# Patient Record
Sex: FEMALE | Race: WHITE | Employment: FULL TIME | URBAN - NONMETROPOLITAN AREA
[De-identification: names, ages, dates, MRNs, and addresses within clinical notes are randomized per-mention and may not be internally consistent; named-entity substitution may affect disease eponyms.]

---

## 2017-01-24 ENCOUNTER — OFFICE VISIT (OUTPATIENT)
Dept: NEUROLOGY | Facility: CLINIC | Age: 52
End: 2017-01-24

## 2017-01-24 VITALS
DIASTOLIC BLOOD PRESSURE: 80 MMHG | HEIGHT: 62 IN | SYSTOLIC BLOOD PRESSURE: 122 MMHG | HEART RATE: 84 BPM | WEIGHT: 160 LBS | BODY MASS INDEX: 29.44 KG/M2

## 2017-01-24 DIAGNOSIS — G43.119 INTRACTABLE MIGRAINE WITH AURA WITHOUT STATUS MIGRAINOSUS: Primary | ICD-10-CM

## 2017-01-24 DIAGNOSIS — R51.9 CHRONIC DAILY HEADACHE: ICD-10-CM

## 2017-01-24 PROCEDURE — 99213 OFFICE O/P EST LOW 20 MIN: CPT | Performed by: PHYSICIAN ASSISTANT

## 2017-01-24 RX ORDER — TOPIRAMATE 150 MG/1
150 CAPSULE, EXTENDED RELEASE ORAL DAILY
Qty: 30 EACH | Refills: 0
Start: 2017-01-24 | End: 2017-04-13

## 2017-01-24 NOTE — PROGRESS NOTES
Subjective   Ml Macdonald is a 52 y.o. female is here today for follow-up.    HPI Comments: She continues to have intermittent substantial migraine.  In a recent attack, she had no aura which is atypical for her.  Overall she feels the extended release topiramate has been well tolerated and has reduced in number and intensity of her headaches.    Continues to have very frequent near daily muscle tension type headaches.  These of improved slightly since her last visit.    She reports no new medical issues otherwise.    Migraine    This is a chronic problem. The current episode started more than 1 year ago. The problem occurs intermittently. The problem has been waxing and waning. The pain is located in the bilateral, retro-orbital, temporal, parietal, occipital and frontal region. The pain does not radiate. The pain quality is similar to prior headaches. The quality of the pain is described as aching, throbbing and pulsating. The pain is severe. Associated symptoms include dizziness, nausea, photophobia and vomiting. Pertinent negatives include no abdominal pain, back pain, coughing, ear pain, eye pain, fever, neck pain, seizures, sore throat, tinnitus or weakness. The symptoms are aggravated by activity, bright light, fatigue, emotional stress and noise. She has tried acetaminophen, antidepressants, beta blockers, Excedrin, darkened room, NSAIDs, injectable narcotics, oral narcotics and triptans for the symptoms. The treatment provided mild relief. Her past medical history is significant for migraine headaches.   Headache    This is a chronic problem. The current episode started more than 1 year ago. The problem occurs daily. The problem has been unchanged. The pain is located in the bilateral region. The pain does not radiate. The pain quality is similar to prior headaches. The quality of the pain is described as aching. The pain is moderate. Associated symptoms include dizziness, nausea, photophobia and vomiting.  Pertinent negatives include no abdominal pain, back pain, coughing, ear pain, eye pain, fever, neck pain, seizures, sore throat, tinnitus or weakness. The symptoms are aggravated by activity, bright light, fatigue, emotional stress, noise, hunger and work. She has tried acetaminophen, antidepressants, beta blockers, Excedrin, darkened room, NSAIDs, injectable narcotics, oral narcotics and triptans for the symptoms. The treatment provided moderate relief. Her past medical history is significant for migraine headaches.       The following portions of the patient's history were reviewed and updated as appropriate: allergies, current medications, past family history, past medical history, past social history, past surgical history and problem list.    Review of Systems   Constitutional: Positive for fatigue. Negative for chills and fever.   HENT: Negative for ear pain, nosebleeds, sore throat and tinnitus.    Eyes: Positive for photophobia. Negative for pain and visual disturbance.   Respiratory: Negative.  Negative for cough.    Cardiovascular: Negative.    Gastrointestinal: Positive for nausea and vomiting. Negative for abdominal pain.   Endocrine: Negative.    Genitourinary: Negative.    Musculoskeletal: Negative.  Negative for back pain and neck pain.   Skin: Negative.    Allergic/Immunologic: Negative.    Neurological: Positive for dizziness and headaches. Negative for seizures and weakness.   Hematological: Negative.    Psychiatric/Behavioral: Negative.        Objective   Physical Exam   Constitutional: She is oriented to person, place, and time. Vital signs are normal. She appears well-developed and well-nourished. No distress.   HENT:   Head: Normocephalic and atraumatic.   Right Ear: Hearing, tympanic membrane, external ear and ear canal normal.   Left Ear: Hearing, tympanic membrane, external ear and ear canal normal.   Nose: Nose normal.   Mouth/Throat: Uvula is midline, oropharynx is clear and moist and  mucous membranes are normal.   Eyes: Conjunctivae, EOM and lids are normal. Pupils are equal, round, and reactive to light. No scleral icterus.   Fundoscopic exam:       The right eye shows no hemorrhage and no papilledema. The right eye shows red reflex.        The left eye shows no hemorrhage and no papilledema. The left eye shows red reflex.   Neck: Normal range of motion and phonation normal. No spinous process tenderness and no muscular tenderness present. Carotid bruit is not present. Normal range of motion present. No thyroid mass and no thyromegaly present.   Cardiovascular: Normal rate, regular rhythm, S1 normal, S2 normal and normal heart sounds.    No murmur heard.  Pulmonary/Chest: Effort normal and breath sounds normal. No stridor. No respiratory distress. She has no wheezes. She has no rales.   Musculoskeletal: Normal range of motion. She exhibits no edema or tenderness.        Lumbar back: Normal.   Lymphadenopathy:     She has no cervical adenopathy.   Neurological: She is alert and oriented to person, place, and time. She has normal strength and normal reflexes. She displays no atrophy and no tremor. No cranial nerve deficit or sensory deficit. She exhibits normal muscle tone. She displays a negative Romberg sign. Coordination and gait normal. GCS eye subscore is 4. GCS verbal subscore is 5. GCS motor subscore is 6.   Reflex Scores:       Tricep reflexes are 2+ on the right side and 2+ on the left side.       Bicep reflexes are 2+ on the right side and 2+ on the left side.       Brachioradialis reflexes are 2+ on the right side and 2+ on the left side.       Patellar reflexes are 2+ on the right side and 2+ on the left side.       Achilles reflexes are 2+ on the right side and 2+ on the left side.  Skin: Skin is warm, dry and intact. No ecchymosis, no lesion and no rash noted. No cyanosis. Nails show no clubbing.   Psychiatric: She has a normal mood and affect. Her speech is normal and behavior is  normal. Judgment and thought content normal. She is not actively hallucinating. Cognition and memory are normal. She is attentive.   Nursing note and vitals reviewed.        Assessment/Plan   Ml was seen today for migraine and headache.    Diagnoses and all orders for this visit:    Intractable migraine with aura without status migrainosus  -     Ambulatory Referral to Neurology  -     Ambulatory Referral to Neurology  -     Topiramate  MG capsule extended-release 24 hour sprinkle; Take 150 mg by mouth Daily.    Chronic daily headache    We are going to refer her for gentle Botox as well as auriculotemporal and occipital nerve blocks.  We will increase her Qudexy to 150 mg daily.    She is asked to fill out some [facility] paperwork for occasional absences for migraine and we will do this for her.  15 minutes of a 20 minute outpatient visit was spent in counseling and coordination of care today.          EMR Dragon transcription disclaimer:  Much of this encounter note is an electronic transcription/translation of spoken language to printed text.  The electronic translation of spoken language may permit erroneous, or at times, nonsensical words or phrases to be inadvertently transcribed.  The author has reviewed the note for such errors, however some may still exist.

## 2017-01-25 ENCOUNTER — TELEPHONE (OUTPATIENT)
Dept: NEUROLOGY | Facility: CLINIC | Age: 52
End: 2017-01-25

## 2017-01-25 NOTE — TELEPHONE ENCOUNTER
Ml called and asked that we please attach the [employer] number she provided to her [employer] papers. I did write that on her forms.

## 2017-02-15 ENCOUNTER — TELEPHONE (OUTPATIENT)
Dept: NEUROLOGY | Facility: CLINIC | Age: 52
End: 2017-02-15

## 2017-02-15 NOTE — TELEPHONE ENCOUNTER
I called Ml back to let her know that I did receive her call. She called to say that her FMLA has been denied. They did tell her that they never received a faxed copy of the completed forms. I faxed the forms again and I did mail the originals to Mrs. Macdonald. She did voice understanding.

## 2017-03-03 ENCOUNTER — PROCEDURE VISIT (OUTPATIENT)
Dept: NEUROLOGY | Facility: CLINIC | Age: 52
End: 2017-03-03

## 2017-03-03 VITALS
WEIGHT: 160 LBS | HEIGHT: 62 IN | HEART RATE: 78 BPM | BODY MASS INDEX: 29.44 KG/M2 | DIASTOLIC BLOOD PRESSURE: 70 MMHG | SYSTOLIC BLOOD PRESSURE: 118 MMHG

## 2017-03-03 DIAGNOSIS — R51.9 CHRONIC DAILY HEADACHE: ICD-10-CM

## 2017-03-03 DIAGNOSIS — G43.119 INTRACTABLE MIGRAINE WITH AURA WITHOUT STATUS MIGRAINOSUS: Primary | ICD-10-CM

## 2017-03-03 PROCEDURE — 99213 OFFICE O/P EST LOW 20 MIN: CPT | Performed by: PHYSICIAN ASSISTANT

## 2017-03-03 PROCEDURE — 64405 NJX AA&/STRD GR OCPL NRV: CPT | Performed by: PHYSICIAN ASSISTANT

## 2017-03-03 RX ORDER — TRIAMCINOLONE ACETONIDE 40 MG/ML
12 INJECTION, SUSPENSION INTRA-ARTICULAR; INTRAMUSCULAR ONCE
Status: COMPLETED | OUTPATIENT
Start: 2017-03-03 | End: 2017-03-03

## 2017-03-03 RX ADMIN — TRIAMCINOLONE ACETONIDE 12 MG: 40 INJECTION, SUSPENSION INTRA-ARTICULAR; INTRAMUSCULAR at 15:36

## 2017-03-05 NOTE — PROGRESS NOTES
Subjective   Ml Macdonald is a 52 y.o. female is here today for follow-up.    HPI Comments: She continues to have intermittent substantial migraine.  In a recent attack, she had no aura which is atypical for her.  Overall she feels the extended release topiramate has been well tolerated and has reduced in number and intensity of her headaches.    Continues to have very frequent near daily muscle tension type headaches.  These of improved slightly since her last visit.    She reports no new medical issues otherwise.    Migraine    This is a chronic problem. The current episode started more than 1 year ago. The problem occurs intermittently. The problem has been waxing and waning. The pain is located in the bilateral, retro-orbital, temporal, parietal, occipital and frontal region. The pain does not radiate. The pain quality is similar to prior headaches. The quality of the pain is described as aching, throbbing and pulsating. The pain is severe. Associated symptoms include dizziness, nausea, photophobia and vomiting. Pertinent negatives include no abdominal pain, back pain, coughing, ear pain, eye pain, fever, neck pain, seizures, sore throat, tinnitus or weakness. The symptoms are aggravated by activity, bright light, fatigue, emotional stress and noise. She has tried acetaminophen, antidepressants, beta blockers, Excedrin, darkened room, NSAIDs, injectable narcotics, oral narcotics and triptans for the symptoms. The treatment provided mild relief. Her past medical history is significant for migraine headaches.   Headache    This is a chronic problem. The current episode started more than 1 year ago. The problem occurs daily. The problem has been unchanged. The pain is located in the bilateral region. The pain does not radiate. The pain quality is similar to prior headaches. The quality of the pain is described as aching. The pain is moderate. Associated symptoms include dizziness, nausea, photophobia and vomiting.  Pertinent negatives include no abdominal pain, back pain, coughing, ear pain, eye pain, fever, neck pain, seizures, sore throat, tinnitus or weakness. The symptoms are aggravated by activity, bright light, fatigue, emotional stress, noise, hunger and work. She has tried acetaminophen, antidepressants, beta blockers, Excedrin, darkened room, NSAIDs, injectable narcotics, oral narcotics and triptans for the symptoms. The treatment provided moderate relief. Her past medical history is significant for migraine headaches.       The following portions of the patient's history were reviewed and updated as appropriate: allergies, current medications, past family history, past medical history, past social history, past surgical history and problem list.    Review of Systems   Constitutional: Positive for fatigue. Negative for chills and fever.   HENT: Negative for ear pain, nosebleeds, sore throat and tinnitus.    Eyes: Positive for photophobia. Negative for pain and visual disturbance.   Respiratory: Negative.  Negative for cough.    Cardiovascular: Negative.    Gastrointestinal: Positive for nausea and vomiting. Negative for abdominal pain.   Endocrine: Negative.    Genitourinary: Negative.    Musculoskeletal: Negative.  Negative for back pain and neck pain.   Skin: Negative.    Allergic/Immunologic: Negative.    Neurological: Positive for dizziness and headaches. Negative for seizures and weakness.   Hematological: Negative.    Psychiatric/Behavioral: Negative.        Objective   Physical Exam   Constitutional: She is oriented to person, place, and time. Vital signs are normal. She appears well-developed and well-nourished. No distress.   HENT:   Head: Normocephalic and atraumatic.   Right Ear: Hearing, tympanic membrane, external ear and ear canal normal.   Left Ear: Hearing, tympanic membrane, external ear and ear canal normal.   Nose: Nose normal.   Mouth/Throat: Uvula is midline, oropharynx is clear and moist and  mucous membranes are normal.   Eyes: Conjunctivae, EOM and lids are normal. Pupils are equal, round, and reactive to light. No scleral icterus.   Fundoscopic exam:       The right eye shows no hemorrhage and no papilledema. The right eye shows red reflex.        The left eye shows no hemorrhage and no papilledema. The left eye shows red reflex.   Neck: Normal range of motion and phonation normal. No spinous process tenderness and no muscular tenderness present. Carotid bruit is not present. Normal range of motion present. No thyroid mass and no thyromegaly present.   Cardiovascular: Normal rate, regular rhythm, S1 normal, S2 normal and normal heart sounds.    No murmur heard.  Pulmonary/Chest: Effort normal and breath sounds normal. No stridor. No respiratory distress. She has no wheezes. She has no rales.   Musculoskeletal: Normal range of motion. She exhibits no edema or tenderness.        Lumbar back: Normal.   Lymphadenopathy:     She has no cervical adenopathy.   Neurological: She is alert and oriented to person, place, and time. She has normal strength and normal reflexes. She displays no atrophy and no tremor. No cranial nerve deficit or sensory deficit. She exhibits normal muscle tone. She displays a negative Romberg sign. Coordination and gait normal. GCS eye subscore is 4. GCS verbal subscore is 5. GCS motor subscore is 6.   Reflex Scores:       Tricep reflexes are 2+ on the right side and 2+ on the left side.       Bicep reflexes are 2+ on the right side and 2+ on the left side.       Brachioradialis reflexes are 2+ on the right side and 2+ on the left side.       Patellar reflexes are 2+ on the right side and 2+ on the left side.       Achilles reflexes are 2+ on the right side and 2+ on the left side.  Skin: Skin is warm, dry and intact. No ecchymosis, no lesion and no rash noted. No cyanosis. Nails show no clubbing.   Psychiatric: She has a normal mood and affect. Her speech is normal and behavior is  normal. Judgment and thought content normal. She is not actively hallucinating. Cognition and memory are normal. She is attentive.   Nursing note and vitals reviewed.        Assessment/Plan   Ml was seen today for migraine.    Diagnoses and all orders for this visit:    Intractable migraine with aura without status migrainosus  -     triamcinolone acetonide (KENALOG-40) injection 12 mg; 0.3 mL by Peripheral Nerve route 1 (One) Time.  -     triamcinolone acetonide (KENALOG-40) injection 12 mg; 0.3 mL by Peripheral Nerve route 1 (One) Time.    Chronic daily headache  -     triamcinolone acetonide (KENALOG-40) injection 12 mg; 0.3 mL by Peripheral Nerve route 1 (One) Time.  -     triamcinolone acetonide (KENALOG-40) injection 12 mg; 0.3 mL by Peripheral Nerve route 1 (One) Time.    We are going to increase her extended release topiramate to 200 mg per day.  Further titration may be required depending on her symptoms and tolerance.    We reviewed peripheral nerve blocks and the roll of these procedures in the treatment of her headache.    Referral for Botox injections for her migraines is still pending approval.    10 minutes of 15 minute outpatient visit was spent in counseling and coordination of care this was above beyond that required for the auricular temporal branch block and occipital nerve block.          EMR Dragon transcription disclaimer:  Much of this encounter note is an electronic transcription/translation of spoken language to printed text.  The electronic translation of spoken language may permit erroneous, or at times, nonsensical words or phrases to be inadvertently transcribed.  The author has reviewed the note for such errors, however some may still exist.

## 2017-03-06 NOTE — PROGRESS NOTES
Procedure   Nerve Block  Date/Time: 3/6/2017 10:33 AM  Performed by: JACK BRANCH  Authorized by: JACK BRANCH   Consent: Verbal consent obtained.  Consent given by: patient  Patient understanding: patient states understanding of the procedure being performed  Patient consent: the patient's understanding of the procedure matches consent given  Procedure consent: procedure consent matches procedure scheduled  Relevant documents: relevant documents present and verified  Test results: test results available and properly labeled  Site marked: the operative site was not marked  Imaging studies: imaging studies not available  Required items: required blood products, implants, devices, and special equipment available  Patient identity confirmed: verbally with patient  Indications: pain relief  Body area: head  Nerve: greater occipital  Laterality: left  Sedation:  Patient sedated: no    Preparation: Patient was prepped and draped in the usual sterile fashion.  Patient position: sitting  Needle size: 27 G  Location technique: anatomical landmarks  Local Anesthetic: lidocaine 1% without epinephrine   Anesthetic total: 0.7 mL  Outcome: pain improved  Patient tolerance: Patient tolerated the procedure well with no immediate complications

## 2017-03-06 NOTE — PROGRESS NOTES
Procedure   Nerve Block  Date/Time: 3/6/2017 10:31 AM  Performed by: JACK BRANCH  Authorized by: JACK BRANCH   Consent: Verbal consent obtained.  Risks and benefits: risks, benefits and alternatives were discussed  Consent given by: patient  Patient understanding: patient states understanding of the procedure being performed  Patient consent: the patient's understanding of the procedure matches consent given  Procedure consent: procedure consent matches procedure scheduled  Relevant documents: relevant documents present and verified  Test results: test results available and properly labeled  Site marked: the operative site was not marked  Imaging studies: imaging studies not available  Patient identity confirmed: verbally with patient  Indications: pain relief  Body area: head  Nerve: trigeminal  Laterality: left  Sedation:  Patient sedated: no    Preparation: Patient was prepped and draped in the usual sterile fashion.  Patient position: sitting  Needle size: 27 G  Location technique: anatomical landmarks  Local Anesthetic: lidocaine 1% without epinephrine   Anesthetic total: 0.7 mL  Outcome: pain improved  Patient tolerance: Patient tolerated the procedure well with no immediate complications

## 2017-03-14 ENCOUNTER — TELEPHONE (OUTPATIENT)
Dept: NEUROLOGY | Facility: CLINIC | Age: 52
End: 2017-03-14

## 2017-03-14 NOTE — TELEPHONE ENCOUNTER
Mrs Macdonald called to see if she could have more samples of topiramate XR. I did tell her that she could pick some up this afternoon. She did voice understanding.

## 2017-03-15 ENCOUNTER — TELEPHONE (OUTPATIENT)
Dept: NEUROLOGY | Facility: CLINIC | Age: 52
End: 2017-03-15

## 2017-03-15 NOTE — TELEPHONE ENCOUNTER
I did attempt to call Ml to schedule her initial Botox injection but her Voice Mail is not set up. I will try again.

## 2017-04-13 ENCOUNTER — OFFICE VISIT (OUTPATIENT)
Dept: NEUROLOGY | Facility: CLINIC | Age: 52
End: 2017-04-13

## 2017-04-13 VITALS
BODY MASS INDEX: 29.44 KG/M2 | SYSTOLIC BLOOD PRESSURE: 115 MMHG | RESPIRATION RATE: 18 BRPM | TEMPERATURE: 98 F | WEIGHT: 160 LBS | DIASTOLIC BLOOD PRESSURE: 80 MMHG | HEIGHT: 62 IN | HEART RATE: 80 BPM

## 2017-04-13 DIAGNOSIS — R51.9 CHRONIC DAILY HEADACHE: ICD-10-CM

## 2017-04-13 DIAGNOSIS — G43.119 INTRACTABLE MIGRAINE WITH AURA WITHOUT STATUS MIGRAINOSUS: Primary | ICD-10-CM

## 2017-04-13 PROCEDURE — 99213 OFFICE O/P EST LOW 20 MIN: CPT | Performed by: PHYSICIAN ASSISTANT

## 2017-04-13 RX ORDER — TOPIRAMATE 150 MG/1
150 CAPSULE, EXTENDED RELEASE ORAL DAILY
COMMUNITY
End: 2017-04-18 | Stop reason: DRUGHIGH

## 2017-04-19 NOTE — PROGRESS NOTES
Subjective   Ml Macdonald is a 52 y.o. female is here today for follow-up.    HPI Comments: She continues to have intermittent substantial migraine.  She reports two significant migraines this last seen.  Overall she feels the extended release topiramate has been well tolerated and has reduced in number and intensity of her headaches.    Continues to have very frequent near daily muscle tension type headaches.  These of improved slightly since her last visit.    She reports no new medical issues otherwise.    Migraine    This is a chronic problem. The current episode started more than 1 year ago. The problem occurs intermittently. The problem has been gradually improving. The pain is located in the bilateral, retro-orbital, temporal, parietal, occipital and frontal region. The pain does not radiate. The pain quality is similar to prior headaches. The quality of the pain is described as aching, throbbing and pulsating. The pain is severe. Associated symptoms include dizziness, nausea, photophobia and vomiting. Pertinent negatives include no abdominal pain, back pain, coughing, ear pain, eye pain, fever, neck pain, seizures, sore throat, tinnitus or weakness. The symptoms are aggravated by activity, bright light, fatigue, emotional stress and noise. She has tried acetaminophen, antidepressants, beta blockers, Excedrin, darkened room, NSAIDs, injectable narcotics, oral narcotics and triptans for the symptoms. The treatment provided mild relief. Her past medical history is significant for migraine headaches.   Headache    This is a chronic problem. The current episode started more than 1 year ago. The problem occurs daily. The problem has been unchanged. The pain is located in the bilateral region. The pain does not radiate. The pain quality is similar to prior headaches. The quality of the pain is described as aching. The pain is moderate. Associated symptoms include dizziness, nausea, photophobia and vomiting.  Pertinent negatives include no abdominal pain, back pain, coughing, ear pain, eye pain, fever, neck pain, seizures, sore throat, tinnitus or weakness. The symptoms are aggravated by activity, bright light, fatigue, emotional stress, noise, hunger and work. She has tried acetaminophen, antidepressants, beta blockers, Excedrin, darkened room, NSAIDs, injectable narcotics, oral narcotics and triptans for the symptoms. The treatment provided moderate relief. Her past medical history is significant for migraine headaches.       The following portions of the patient's history were reviewed and updated as appropriate: allergies, current medications, past family history, past medical history, past social history, past surgical history and problem list.    Review of Systems   Constitutional: Positive for fatigue. Negative for chills and fever.   HENT: Negative for ear pain, nosebleeds, sore throat and tinnitus.    Eyes: Positive for photophobia. Negative for pain and visual disturbance.   Respiratory: Negative.  Negative for cough.    Cardiovascular: Negative.    Gastrointestinal: Positive for nausea and vomiting. Negative for abdominal pain.   Endocrine: Negative.    Genitourinary: Negative.    Musculoskeletal: Negative.  Negative for back pain and neck pain.   Skin: Negative.    Allergic/Immunologic: Negative.    Neurological: Positive for dizziness and headaches. Negative for seizures and weakness.   Hematological: Negative.    Psychiatric/Behavioral: Negative.        Objective   Physical Exam   Constitutional: She is oriented to person, place, and time. Vital signs are normal. She appears well-developed and well-nourished. No distress.   HENT:   Head: Normocephalic and atraumatic.   Right Ear: Hearing, tympanic membrane, external ear and ear canal normal.   Left Ear: Hearing, tympanic membrane, external ear and ear canal normal.   Nose: Nose normal.   Mouth/Throat: Uvula is midline, oropharynx is clear and moist and  mucous membranes are normal.   Eyes: Conjunctivae, EOM and lids are normal. Pupils are equal, round, and reactive to light. No scleral icterus.   Fundoscopic exam:       The right eye shows no hemorrhage and no papilledema. The right eye shows red reflex.        The left eye shows no hemorrhage and no papilledema. The left eye shows red reflex.   Neck: Normal range of motion and phonation normal. No spinous process tenderness and no muscular tenderness present. Carotid bruit is not present. Normal range of motion present. No thyroid mass and no thyromegaly present.   Cardiovascular: Normal rate, regular rhythm, S1 normal, S2 normal and normal heart sounds.    No murmur heard.  Pulmonary/Chest: Effort normal and breath sounds normal. No stridor. No respiratory distress. She has no wheezes. She has no rales.   Musculoskeletal: Normal range of motion. She exhibits no edema or tenderness.   Lymphadenopathy:     She has no cervical adenopathy.   Neurological: She is alert and oriented to person, place, and time. She has normal strength and normal reflexes. She displays no atrophy and no tremor. No cranial nerve deficit or sensory deficit. She exhibits normal muscle tone. She displays a negative Romberg sign. Coordination and gait normal. GCS eye subscore is 4. GCS verbal subscore is 5. GCS motor subscore is 6.   Reflex Scores:       Tricep reflexes are 2+ on the right side and 2+ on the left side.       Bicep reflexes are 2+ on the right side and 2+ on the left side.       Brachioradialis reflexes are 2+ on the right side and 2+ on the left side.       Patellar reflexes are 2+ on the right side and 2+ on the left side.       Achilles reflexes are 2+ on the right side and 2+ on the left side.  Skin: Skin is warm, dry and intact. No ecchymosis, no lesion and no rash noted. No cyanosis. Nails show no clubbing.   Psychiatric: She has a normal mood and affect. Her speech is normal and behavior is normal. Judgment and thought  content normal. She is not actively hallucinating. Cognition and memory are normal. She is attentive.   Nursing note and vitals reviewed.        Assessment/Plan   Ml was seen today for follow-up.    Diagnoses and all orders for this visit:    Intractable migraine with aura without status migrainosus  -     Topiramate ER (TROKENDI XR) 200 MG capsule sustained-release 24 hr; Take 200 mg by mouth Daily.    Chronic daily headache    We will increase extended release topiramate 200 mg daily.    Continue other medications unchanged.    10 minutes of a 15 minute outpatient visit was spent in counseling and coordination of care today.        EMR Dragon transcription disclaimer:  Much of this encounter note is an electronic transcription/translation of spoken language to printed text.  The electronic translation of spoken language may permit erroneous, or at times, nonsensical words or phrases to be inadvertently transcribed.  The author has reviewed the note for such errors, however some may still exist.

## 2017-05-12 DIAGNOSIS — G43.119 INTRACTABLE MIGRAINE WITH AURA WITHOUT STATUS MIGRAINOSUS: ICD-10-CM

## 2017-05-15 DIAGNOSIS — G43.119 INTRACTABLE MIGRAINE WITH AURA WITHOUT STATUS MIGRAINOSUS: ICD-10-CM

## 2017-05-26 ENCOUNTER — OFFICE VISIT (OUTPATIENT)
Dept: NEUROLOGY | Facility: CLINIC | Age: 52
End: 2017-05-26

## 2017-05-26 VITALS
HEIGHT: 62 IN | BODY MASS INDEX: 29.26 KG/M2 | SYSTOLIC BLOOD PRESSURE: 116 MMHG | HEART RATE: 78 BPM | DIASTOLIC BLOOD PRESSURE: 74 MMHG | WEIGHT: 159 LBS

## 2017-05-26 DIAGNOSIS — G43.119 INTRACTABLE MIGRAINE WITH AURA WITHOUT STATUS MIGRAINOSUS: Primary | ICD-10-CM

## 2017-05-26 DIAGNOSIS — R51.9 CHRONIC DAILY HEADACHE: ICD-10-CM

## 2017-05-26 PROCEDURE — 99213 OFFICE O/P EST LOW 20 MIN: CPT | Performed by: PHYSICIAN ASSISTANT

## 2017-05-26 RX ORDER — TOPIRAMATE 200 MG/1
200 CAPSULE, EXTENDED RELEASE ORAL DAILY
Qty: 30 CAPSULE | Refills: 11 | Status: SHIPPED | OUTPATIENT
Start: 2017-05-26 | End: 2018-06-08 | Stop reason: SDUPTHER

## 2017-06-12 NOTE — PROGRESS NOTES
Subjective   Ml Macdonald is a 52 y.o. female is here today for follow-up.    HPI Comments: She continues to have intermittent substantial migraine.  She reports two significant migraines this last seen.  Overall she feels the extended release topiramate has been well tolerated and has reduced in number and intensity of her headaches.    Continues to have very frequent near daily muscle tension type headaches.  These of improved slightly since her last visit.    She reports no new medical issues otherwise.    Migraine    This is a chronic problem. The current episode started more than 1 year ago. The problem occurs intermittently. The problem has been rapidly improving. The pain is located in the bilateral, retro-orbital, temporal, parietal, occipital and frontal region. The pain does not radiate. The pain quality is similar to prior headaches. The quality of the pain is described as aching, throbbing and pulsating. The pain is severe. Associated symptoms include dizziness, nausea, photophobia and vomiting. Pertinent negatives include no abdominal pain, back pain, coughing, ear pain, eye pain, fever, neck pain, seizures, sore throat, tinnitus or weakness. The symptoms are aggravated by activity, bright light, fatigue, emotional stress and noise. She has tried acetaminophen, antidepressants, beta blockers, Excedrin, darkened room, NSAIDs, injectable narcotics, oral narcotics and triptans for the symptoms. The treatment provided mild relief. Her past medical history is significant for migraine headaches.   Headache    This is a chronic problem. The current episode started more than 1 year ago. The problem occurs daily. The problem has been waxing and waning. The pain is located in the bilateral region. The pain does not radiate. The pain quality is similar to prior headaches. The quality of the pain is described as aching. The pain is moderate. Associated symptoms include dizziness, nausea, photophobia and vomiting.  Pertinent negatives include no abdominal pain, back pain, coughing, ear pain, eye pain, fever, neck pain, seizures, sore throat, tinnitus or weakness. The symptoms are aggravated by activity, bright light, fatigue, emotional stress, noise, hunger and work. She has tried acetaminophen, antidepressants, beta blockers, Excedrin, darkened room, NSAIDs, injectable narcotics, oral narcotics and triptans for the symptoms. The treatment provided moderate relief. Her past medical history is significant for migraine headaches.       The following portions of the patient's history were reviewed and updated as appropriate: allergies, current medications, past family history, past medical history, past social history, past surgical history and problem list.    Review of Systems   Constitutional: Positive for fatigue. Negative for chills and fever.   HENT: Negative for ear pain, nosebleeds, sore throat and tinnitus.    Eyes: Positive for photophobia. Negative for pain and visual disturbance.   Respiratory: Negative.  Negative for cough.    Cardiovascular: Negative.    Gastrointestinal: Positive for nausea and vomiting. Negative for abdominal pain.   Endocrine: Negative.    Genitourinary: Negative.    Musculoskeletal: Negative.  Negative for back pain and neck pain.   Skin: Negative.    Allergic/Immunologic: Negative.    Neurological: Positive for dizziness and headaches. Negative for seizures and weakness.   Hematological: Negative.    Psychiatric/Behavioral: Negative.        Objective   Physical Exam   Constitutional: She is oriented to person, place, and time. Vital signs are normal. She appears well-developed and well-nourished. No distress.   HENT:   Head: Normocephalic and atraumatic.   Right Ear: Hearing, tympanic membrane, external ear and ear canal normal.   Left Ear: Hearing, tympanic membrane, external ear and ear canal normal.   Nose: Nose normal.   Mouth/Throat: Uvula is midline, oropharynx is clear and moist and  mucous membranes are normal.   Eyes: Conjunctivae, EOM and lids are normal. Pupils are equal, round, and reactive to light. No scleral icterus.   Fundoscopic exam:       The right eye shows no hemorrhage and no papilledema. The right eye shows red reflex.        The left eye shows no hemorrhage and no papilledema. The left eye shows red reflex.   Neck: Normal range of motion and phonation normal. No spinous process tenderness and no muscular tenderness present. Carotid bruit is not present. Normal range of motion present. No thyroid mass and no thyromegaly present.   Cardiovascular: Normal rate, regular rhythm, S1 normal, S2 normal and normal heart sounds.    No murmur heard.  Pulmonary/Chest: Effort normal and breath sounds normal. No stridor. No respiratory distress. She has no wheezes. She has no rales.   Musculoskeletal: Normal range of motion. She exhibits no edema or tenderness.   Lymphadenopathy:     She has no cervical adenopathy.   Neurological: She is alert and oriented to person, place, and time. She has normal strength and normal reflexes. She displays no atrophy and no tremor. No cranial nerve deficit or sensory deficit. She exhibits normal muscle tone. She displays a negative Romberg sign. Coordination and gait normal. GCS eye subscore is 4. GCS verbal subscore is 5. GCS motor subscore is 6.   Reflex Scores:       Tricep reflexes are 2+ on the right side and 2+ on the left side.       Bicep reflexes are 2+ on the right side and 2+ on the left side.       Brachioradialis reflexes are 2+ on the right side and 2+ on the left side.       Patellar reflexes are 2+ on the right side and 2+ on the left side.       Achilles reflexes are 2+ on the right side and 2+ on the left side.  Skin: Skin is warm, dry and intact. No ecchymosis, no lesion and no rash noted. No cyanosis. Nails show no clubbing.   Psychiatric: She has a normal mood and affect. Her speech is normal and behavior is normal. Judgment and thought  content normal. She is not actively hallucinating. Cognition and memory are normal. She is attentive.   Nursing note and vitals reviewed.        Assessment/Plan    Ml was seen today for migraine.    Diagnoses and all orders for this visit:    Intractable migraine with aura without status migrainosus  -     TROKENDI  MG capsule sustained-release 24 hr; Take 200 mg by mouth Daily.    Chronic daily headache              EMR Dragon transcription disclaimer:  Much of this encounter note is an electronic transcription/translation of spoken language to printed text.  The electronic translation of spoken language may permit erroneous, or at times, nonsensical words or phrases to be inadvertently transcribed.  The author has reviewed the note for such errors, however some may still exist.

## 2017-08-25 ENCOUNTER — OFFICE VISIT (OUTPATIENT)
Dept: NEUROLOGY | Facility: CLINIC | Age: 52
End: 2017-08-25

## 2017-08-25 VITALS
BODY MASS INDEX: 29.63 KG/M2 | HEART RATE: 74 BPM | HEIGHT: 62 IN | DIASTOLIC BLOOD PRESSURE: 78 MMHG | SYSTOLIC BLOOD PRESSURE: 132 MMHG | WEIGHT: 161 LBS

## 2017-08-25 DIAGNOSIS — G43.119 INTRACTABLE MIGRAINE WITH AURA WITHOUT STATUS MIGRAINOSUS: Primary | ICD-10-CM

## 2017-08-25 PROCEDURE — 99213 OFFICE O/P EST LOW 20 MIN: CPT | Performed by: PHYSICIAN ASSISTANT

## 2017-08-25 RX ORDER — SUMATRIPTAN 100 MG/1
100 TABLET, FILM COATED ORAL
Qty: 6 TABLET | Refills: 3 | Status: SHIPPED | OUTPATIENT
Start: 2017-08-25 | End: 2018-03-09 | Stop reason: SDUPTHER

## 2017-08-25 RX ORDER — SUMATRIPTAN SUCCINATE 11 MG/1
11 CAPSULE NASAL SEE ADMIN INSTRUCTIONS
Qty: 8 EACH | Refills: 6 | Status: SHIPPED | OUTPATIENT
Start: 2017-08-25 | End: 2019-09-06

## 2017-08-25 NOTE — PROGRESS NOTES
Subjective   Ml Macdonald is a 52 y.o. female is here today for follow-up.    HPI Comments: She continues to have intermittent substantial migraine.  She reports two significant migraines this last seen.  Overall she feels the extended release topiramate has been well tolerated and has reduced in number and intensity of her headaches.  Onzetra works very well episodically with rapid onset of relief and minimal side effects.    Significant improvement in her daily headache as well.    She reports no new medical issues otherwise.    Migraine    This is a chronic problem. The current episode started more than 1 year ago. The problem occurs intermittently. The problem has been rapidly improving. The pain is located in the bilateral, retro-orbital, temporal, parietal, occipital and frontal region. The pain does not radiate. The pain quality is similar to prior headaches. The quality of the pain is described as aching, throbbing and pulsating. The pain is severe. Associated symptoms include dizziness, nausea and photophobia. Pertinent negatives include no abdominal pain, ear pain, eye pain, fever, seizures, sore throat, tinnitus, vomiting or weakness. The symptoms are aggravated by activity, bright light, fatigue, emotional stress and noise. She has tried acetaminophen, antidepressants, beta blockers, Excedrin, darkened room, NSAIDs, injectable narcotics, oral narcotics and triptans for the symptoms. The treatment provided mild relief. Her past medical history is significant for migraine headaches.   Headache    This is a chronic problem. The current episode started more than 1 year ago. The problem occurs daily. The problem has been waxing and waning. The pain is located in the bilateral region. The pain does not radiate. The pain quality is similar to prior headaches. The quality of the pain is described as aching. The pain is moderate. Associated symptoms include dizziness, nausea and photophobia. Pertinent negatives  include no abdominal pain, ear pain, eye pain, fever, seizures, sore throat, tinnitus, vomiting or weakness. The symptoms are aggravated by activity, bright light, fatigue, emotional stress, noise, hunger and work. She has tried acetaminophen, antidepressants, beta blockers, Excedrin, darkened room, NSAIDs, injectable narcotics, oral narcotics and triptans for the symptoms. The treatment provided moderate relief. Her past medical history is significant for migraine headaches.       The following portions of the patient's history were reviewed and updated as appropriate: allergies, current medications, past family history, past medical history, past social history, past surgical history and problem list.    Review of Systems   Constitutional: Positive for fatigue. Negative for chills and fever.   HENT: Negative for congestion, ear pain, nosebleeds, sore throat, tinnitus and trouble swallowing.    Eyes: Positive for photophobia. Negative for pain and visual disturbance.   Respiratory: Negative.    Cardiovascular: Negative.    Gastrointestinal: Positive for nausea. Negative for abdominal pain and vomiting.   Endocrine: Negative.    Genitourinary: Negative.    Musculoskeletal: Negative.    Skin: Negative.    Allergic/Immunologic: Negative.    Neurological: Positive for dizziness and headaches. Negative for seizures and weakness.   Hematological: Negative.    Psychiatric/Behavioral: Negative.        Objective   Physical Exam   Constitutional: She is oriented to person, place, and time. Vital signs are normal. She appears well-developed and well-nourished. No distress.   HENT:   Head: Normocephalic and atraumatic.   Right Ear: Hearing, tympanic membrane, external ear and ear canal normal.   Left Ear: Hearing, tympanic membrane, external ear and ear canal normal.   Nose: Nose normal.   Mouth/Throat: Uvula is midline, oropharynx is clear and moist and mucous membranes are normal.   Eyes: Conjunctivae, EOM and lids are  normal. Pupils are equal, round, and reactive to light. No scleral icterus.   Fundoscopic exam:       The right eye shows no hemorrhage and no papilledema. The right eye shows red reflex.        The left eye shows no hemorrhage and no papilledema. The left eye shows red reflex.   Neck: Normal range of motion and phonation normal. No spinous process tenderness and no muscular tenderness present. Carotid bruit is not present. Normal range of motion present. No thyroid mass and no thyromegaly present.   Cardiovascular: Normal rate, regular rhythm, S1 normal, S2 normal and normal heart sounds.    No murmur heard.  Pulmonary/Chest: Effort normal and breath sounds normal. No stridor. No respiratory distress. She has no wheezes. She has no rales.   Musculoskeletal: Normal range of motion. She exhibits no edema or tenderness.   Lymphadenopathy:     She has no cervical adenopathy.   Neurological: She is alert and oriented to person, place, and time. She has normal strength and normal reflexes. She displays no atrophy and no tremor. No cranial nerve deficit or sensory deficit. She exhibits normal muscle tone. She displays a negative Romberg sign. Coordination and gait normal. GCS eye subscore is 4. GCS verbal subscore is 5. GCS motor subscore is 6.   Reflex Scores:       Tricep reflexes are 2+ on the right side and 2+ on the left side.       Bicep reflexes are 2+ on the right side and 2+ on the left side.       Brachioradialis reflexes are 2+ on the right side and 2+ on the left side.       Patellar reflexes are 2+ on the right side and 2+ on the left side.       Achilles reflexes are 2+ on the right side and 2+ on the left side.  Skin: Skin is warm, dry and intact. No ecchymosis, no lesion and no rash noted. No cyanosis. Nails show no clubbing.   Psychiatric: She has a normal mood and affect. Her speech is normal and behavior is normal. Judgment and thought content normal. She is not actively hallucinating. Cognition and  memory are normal. She is attentive.   Nursing note and vitals reviewed.        Assessment/Plan   Ml was seen today for migraine.    Diagnoses and all orders for this visit:    Intractable migraine with aura without status migrainosus  -     ONZETRA XSAIL 11 MG/NOSEPC Exhaler Powder; 11 mg into each nostril See Admin Instructions.  -     SUMAtriptan (IMITREX) 100 MG tablet; Take 1 tablet by mouth Every 2 (Two) Hours As Needed for Migraine (headache).    She is having some difficulty obtaining Onzetra, therefore we will allow her to use of Imitrex tablets even though these of been less effective because of her rapid onset of nausea until we can get Onzetra approved for her.    Her migraines are reduced significantly on Trokendi and we will continue this.    10 minutes of 15 minute outpatient visit was spent in counseling and coordination of care today.          EMR Dragon transcription disclaimer:  Much of this encounter note is an electronic transcription/translation of spoken language to printed text.  The electronic translation of spoken language may permit erroneous, or at times, nonsensical words or phrases to be inadvertently transcribed.  The author has reviewed the note for such errors, however some may still exist.

## 2017-12-06 ENCOUNTER — TELEPHONE (OUTPATIENT)
Dept: NEUROLOGY | Facility: CLINIC | Age: 52
End: 2017-12-06

## 2017-12-06 NOTE — TELEPHONE ENCOUNTER
Ml called to see if we have received her [facility] paperwork. I did call her to let her know that I have not received any paperwork for her yet. We did confirm the fax number and she will call them to have it refaxed.

## 2018-03-09 ENCOUNTER — OFFICE VISIT (OUTPATIENT)
Dept: NEUROLOGY | Facility: CLINIC | Age: 53
End: 2018-03-09

## 2018-03-09 VITALS
SYSTOLIC BLOOD PRESSURE: 118 MMHG | DIASTOLIC BLOOD PRESSURE: 88 MMHG | BODY MASS INDEX: 30 KG/M2 | WEIGHT: 163 LBS | HEART RATE: 76 BPM | HEIGHT: 62 IN

## 2018-03-09 DIAGNOSIS — R51.9 CHRONIC DAILY HEADACHE: ICD-10-CM

## 2018-03-09 DIAGNOSIS — G43.119 INTRACTABLE MIGRAINE WITH AURA WITHOUT STATUS MIGRAINOSUS: Primary | ICD-10-CM

## 2018-03-09 PROCEDURE — 99214 OFFICE O/P EST MOD 30 MIN: CPT | Performed by: PHYSICIAN ASSISTANT

## 2018-03-09 RX ORDER — SUMATRIPTAN 100 MG/1
100 TABLET, FILM COATED ORAL
Qty: 6 TABLET | Refills: 6 | Status: SHIPPED | OUTPATIENT
Start: 2018-03-09 | End: 2019-05-17 | Stop reason: ALTCHOICE

## 2018-03-09 RX ORDER — MEMANTINE HYDROCHLORIDE 10 MG/1
10 TABLET ORAL 2 TIMES DAILY
Qty: 60 TABLET | Refills: 3 | Status: SHIPPED | OUTPATIENT
Start: 2018-03-09 | End: 2018-07-09 | Stop reason: SDUPTHER

## 2018-03-15 NOTE — PROGRESS NOTES
Subjective   Ml Macdonald is a 53 y.o. female is here today for follow-up.    Migraine    This is a chronic problem. The current episode started more than 1 year ago. The problem occurs intermittently. The problem has been rapidly improving. The pain is located in the bilateral, retro-orbital, temporal, parietal, occipital and frontal region. The pain does not radiate. The pain quality is similar to prior headaches. The quality of the pain is described as aching, throbbing and pulsating. The pain is severe. Associated symptoms include dizziness, nausea and photophobia. Pertinent negatives include no abdominal pain, ear pain, eye pain, fever, seizures, sore throat, tinnitus, vomiting or weakness. The symptoms are aggravated by activity, bright light, fatigue, emotional stress and noise. She has tried acetaminophen, antidepressants, beta blockers, Excedrin, darkened room, NSAIDs, injectable narcotics, oral narcotics and triptans for the symptoms. The treatment provided mild relief. Her past medical history is significant for migraine headaches.   Headache    This is a chronic problem. The current episode started more than 1 year ago. The problem occurs daily. The problem has been waxing and waning. The pain is located in the bilateral region. The pain does not radiate. The pain quality is similar to prior headaches. The quality of the pain is described as aching. The pain is moderate. Associated symptoms include dizziness, nausea and photophobia. Pertinent negatives include no abdominal pain, ear pain, eye pain, fever, seizures, sore throat, tinnitus, vomiting or weakness. The symptoms are aggravated by activity, bright light, fatigue, emotional stress, noise, hunger and work. She has tried acetaminophen, antidepressants, beta blockers, Excedrin, darkened room, NSAIDs, injectable narcotics, oral narcotics and triptans for the symptoms. The treatment provided moderate relief. Her past medical history is significant  for migraine headaches.       The following portions of the patient's history were reviewed and updated as appropriate: allergies, current medications, past family history, past medical history, past social history, past surgical history and problem list.    Review of Systems   Constitutional: Positive for fatigue. Negative for chills and fever.   HENT: Negative for congestion, ear pain, nosebleeds, sore throat, tinnitus and trouble swallowing.    Eyes: Positive for photophobia. Negative for pain and visual disturbance.   Respiratory: Negative.    Cardiovascular: Negative.    Gastrointestinal: Positive for nausea. Negative for abdominal pain and vomiting.   Endocrine: Negative.    Genitourinary: Negative.    Musculoskeletal: Negative.    Skin: Negative.    Allergic/Immunologic: Negative.    Neurological: Positive for dizziness and headaches. Negative for seizures and weakness.   Hematological: Negative.    Psychiatric/Behavioral: Negative.        Objective   Physical Exam   Constitutional: She is oriented to person, place, and time. Vital signs are normal. She appears well-developed and well-nourished. No distress.   HENT:   Head: Normocephalic and atraumatic.   Right Ear: Hearing, tympanic membrane, external ear and ear canal normal.   Left Ear: Hearing, tympanic membrane, external ear and ear canal normal.   Nose: Nose normal.   Mouth/Throat: Uvula is midline, oropharynx is clear and moist and mucous membranes are normal.   Eyes: Conjunctivae, EOM and lids are normal. Pupils are equal, round, and reactive to light. No scleral icterus.   Fundoscopic exam:       The right eye shows no hemorrhage and no papilledema. The right eye shows red reflex.        The left eye shows no hemorrhage and no papilledema. The left eye shows red reflex.   Neck: Normal range of motion and phonation normal. No spinous process tenderness and no muscular tenderness present. Carotid bruit is not present. Normal range of motion present. No  thyroid mass and no thyromegaly present.   Cardiovascular: Normal rate, regular rhythm, S1 normal, S2 normal and normal heart sounds.    No murmur heard.  Pulmonary/Chest: Effort normal and breath sounds normal. No stridor. No respiratory distress. She has no wheezes. She has no rales.   Musculoskeletal: Normal range of motion. She exhibits no edema or tenderness.   Lymphadenopathy:     She has no cervical adenopathy.   Neurological: She is alert and oriented to person, place, and time. She has normal strength and normal reflexes. She displays no atrophy and no tremor. No cranial nerve deficit or sensory deficit. She exhibits normal muscle tone. She displays a negative Romberg sign. Coordination and gait normal. GCS eye subscore is 4. GCS verbal subscore is 5. GCS motor subscore is 6.   Reflex Scores:       Tricep reflexes are 2+ on the right side and 2+ on the left side.       Bicep reflexes are 2+ on the right side and 2+ on the left side.       Brachioradialis reflexes are 2+ on the right side and 2+ on the left side.       Patellar reflexes are 2+ on the right side and 2+ on the left side.       Achilles reflexes are 2+ on the right side and 2+ on the left side.  Skin: Skin is warm, dry and intact. No ecchymosis, no lesion and no rash noted. No cyanosis. Nails show no clubbing.   Psychiatric: She has a normal mood and affect. Her speech is normal and behavior is normal. Judgment and thought content normal. She is not actively hallucinating. Cognition and memory are normal. She is attentive.   Nursing note and vitals reviewed.        Assessment/Plan   Ml was seen today for migraine and headache.    Diagnoses and all orders for this visit:    Intractable migraine with aura without status migrainosus  -     memantine (NAMENDA) 10 MG tablet; Take 1 tablet by mouth 2 (Two) Times a Day.  -     SUMAtriptan (IMITREX) 100 MG tablet; Take 1 tablet by mouth Every 2 (Two) Hours As Needed for Migraine  (headache).    Chronic daily headache    Medication recommendations as above.  Medication strategies are nonmedical strategies for controlling her headaches are reviewed in detail.  20 minutes of a 25 minute outpatient visit was spent in counseling and coordination of care today.      EMR Dragon transcription disclaimer:  Much of this encounter note is an electronic transcription/translation of spoken language to printed text.  The electronic translation of spoken language may permit erroneous, or at times, nonsensical words or phrases to be inadvertently transcribed.  The author has reviewed the note for such errors, however some may still exist.

## 2018-06-08 ENCOUNTER — OFFICE VISIT (OUTPATIENT)
Dept: NEUROLOGY | Facility: CLINIC | Age: 53
End: 2018-06-08

## 2018-06-08 VITALS
HEART RATE: 76 BPM | SYSTOLIC BLOOD PRESSURE: 122 MMHG | BODY MASS INDEX: 29.81 KG/M2 | HEIGHT: 62 IN | DIASTOLIC BLOOD PRESSURE: 78 MMHG | WEIGHT: 162 LBS

## 2018-06-08 DIAGNOSIS — G43.119 INTRACTABLE MIGRAINE WITH AURA WITHOUT STATUS MIGRAINOSUS: Primary | ICD-10-CM

## 2018-06-08 DIAGNOSIS — R51.9 CHRONIC DAILY HEADACHE: ICD-10-CM

## 2018-06-08 PROCEDURE — 99213 OFFICE O/P EST LOW 20 MIN: CPT | Performed by: PHYSICIAN ASSISTANT

## 2018-06-08 RX ORDER — TOPIRAMATE 200 MG/1
200 CAPSULE, EXTENDED RELEASE ORAL DAILY
Qty: 30 CAPSULE | Refills: 11 | Status: SHIPPED | OUTPATIENT
Start: 2018-06-08 | End: 2019-07-08 | Stop reason: SDUPTHER

## 2018-06-08 RX ORDER — INDOMETHACIN 25 MG/1
50 CAPSULE ORAL 3 TIMES DAILY PRN
Qty: 60 CAPSULE | Refills: 1 | Status: SHIPPED | OUTPATIENT
Start: 2018-06-08 | End: 2018-10-05 | Stop reason: SDUPTHER

## 2018-06-08 NOTE — PROGRESS NOTES
Subjective   Ml Macdonald is a 53 y.o. female is here today for follow-up.    Migraine    This is a chronic problem. The current episode started more than 1 year ago. The problem occurs intermittently. The problem has been unchanged. The pain is located in the bilateral, retro-orbital, temporal, parietal, occipital and frontal region. The pain does not radiate. The pain quality is similar to prior headaches. The quality of the pain is described as aching, throbbing and pulsating. The pain is severe. Associated symptoms include dizziness, nausea and photophobia. Pertinent negatives include no abdominal pain, ear pain, eye pain, fever, seizures, sore throat, tinnitus, vomiting or weakness. The symptoms are aggravated by activity, bright light, fatigue, emotional stress and noise. She has tried acetaminophen, antidepressants, beta blockers, Excedrin, darkened room, NSAIDs, injectable narcotics, oral narcotics and triptans for the symptoms. The treatment provided significant relief. Her past medical history is significant for migraine headaches.   Headache    This is a chronic problem. The current episode started more than 1 year ago. The problem occurs daily. The problem has been unchanged. The pain is located in the bilateral region. The pain does not radiate. The pain quality is similar to prior headaches. The quality of the pain is described as aching. The pain is moderate. Associated symptoms include dizziness, nausea and photophobia. Pertinent negatives include no abdominal pain, ear pain, eye pain, fever, seizures, sore throat, tinnitus, vomiting or weakness. The symptoms are aggravated by activity, bright light, fatigue, emotional stress, noise, hunger and work. She has tried acetaminophen, antidepressants, beta blockers, Excedrin, darkened room, NSAIDs, injectable narcotics, oral narcotics and triptans for the symptoms. The treatment provided moderate relief. Her past medical history is significant for  migraine headaches.       The following portions of the patient's history were reviewed and updated as appropriate: allergies, current medications, past family history, past medical history, past social history, past surgical history and problem list.    Review of Systems   Constitutional: Positive for fatigue. Negative for fever.   HENT: Negative for congestion, ear pain, nosebleeds, sore throat, tinnitus and trouble swallowing.    Eyes: Positive for photophobia. Negative for pain and visual disturbance.   Respiratory: Negative.    Cardiovascular: Negative.    Gastrointestinal: Positive for nausea. Negative for abdominal pain and vomiting.   Endocrine: Negative.    Genitourinary: Negative.    Musculoskeletal: Negative.    Skin: Negative.    Allergic/Immunologic: Negative.    Neurological: Positive for dizziness and headaches. Negative for seizures and weakness.   Hematological: Negative.    Psychiatric/Behavioral: Negative.        Objective   Physical Exam   Constitutional: She is oriented to person, place, and time. Vital signs are normal. She appears well-developed and well-nourished. No distress.   HENT:   Head: Normocephalic and atraumatic.   Right Ear: Hearing, tympanic membrane, external ear and ear canal normal.   Left Ear: Hearing, tympanic membrane, external ear and ear canal normal.   Nose: Nose normal.   Mouth/Throat: Uvula is midline, oropharynx is clear and moist and mucous membranes are normal.   Eyes: Conjunctivae, EOM and lids are normal. Pupils are equal, round, and reactive to light. No scleral icterus.   Fundoscopic exam:       The right eye shows no hemorrhage and no papilledema. The right eye shows red reflex.        The left eye shows no hemorrhage and no papilledema. The left eye shows red reflex.   Neck: Normal range of motion and phonation normal. No spinous process tenderness and no muscular tenderness present. Carotid bruit is not present. Normal range of motion present. No thyroid mass  and no thyromegaly present.   Cardiovascular: Normal rate, regular rhythm, S1 normal, S2 normal and normal heart sounds.    No murmur heard.  Pulmonary/Chest: Effort normal and breath sounds normal. No stridor. No respiratory distress. She has no wheezes. She has no rales.   Musculoskeletal: Normal range of motion. She exhibits no edema or tenderness.   Lymphadenopathy:     She has no cervical adenopathy.   Neurological: She is alert and oriented to person, place, and time. She has normal strength and normal reflexes. She displays no atrophy and no tremor. No cranial nerve deficit or sensory deficit. She exhibits normal muscle tone. She displays a negative Romberg sign. Coordination and gait normal. GCS eye subscore is 4. GCS verbal subscore is 5. GCS motor subscore is 6.   Reflex Scores:       Tricep reflexes are 2+ on the right side and 2+ on the left side.       Bicep reflexes are 2+ on the right side and 2+ on the left side.       Brachioradialis reflexes are 2+ on the right side and 2+ on the left side.       Patellar reflexes are 2+ on the right side and 2+ on the left side.       Achilles reflexes are 2+ on the right side and 2+ on the left side.  Skin: Skin is warm, dry and intact. No ecchymosis, no lesion and no rash noted. No cyanosis. Nails show no clubbing.   Psychiatric: She has a normal mood and affect. Her speech is normal and behavior is normal. Judgment and thought content normal. She is not actively hallucinating. Cognition and memory are normal. She is attentive.   Nursing note and vitals reviewed.        Assessment/Plan   Ml was seen today for migraine and headache.    Diagnoses and all orders for this visit:    Intractable migraine with aura without status migrainosus  -     TROKENDI  MG capsule sustained-release 24 hr; Take 200 mg by mouth Daily.    Chronic daily headache  -     indomethacin (INDOCIN) 25 MG capsule; Take 2 capsules by mouth 3 (Three) Times a Day As Needed  (Headache).    The patient is stable with regard to her migraines on prophylactic therapy that includes memantine 10 mg twice per day and Trokendi 200 mg once per day.  The patient has previously failed regular Topamax and continuation of brand name Trokendi is recommended.    For her now rare breakthrough migraines, she will continue to use sumatriptan episodically.    The patient has had some substantial muscle tension type headaches and she may use episodic indomethacin for these troubling headaches.    10 minutes of a 15 minute outpatient visit was spent in counseling and coordination of care the patient and her family today.    Dictated utilizing Dragon dictation.

## 2018-07-09 DIAGNOSIS — G43.119 INTRACTABLE MIGRAINE WITH AURA WITHOUT STATUS MIGRAINOSUS: ICD-10-CM

## 2018-07-10 RX ORDER — MEMANTINE HYDROCHLORIDE 10 MG/1
TABLET ORAL
Qty: 180 TABLET | Refills: 1 | Status: SHIPPED | OUTPATIENT
Start: 2018-07-10 | End: 2019-02-08 | Stop reason: SDUPTHER

## 2018-10-05 ENCOUNTER — OFFICE VISIT (OUTPATIENT)
Dept: NEUROLOGY | Facility: CLINIC | Age: 53
End: 2018-10-05

## 2018-10-05 VITALS
BODY MASS INDEX: 29.63 KG/M2 | HEART RATE: 83 BPM | WEIGHT: 161 LBS | SYSTOLIC BLOOD PRESSURE: 110 MMHG | OXYGEN SATURATION: 98 % | DIASTOLIC BLOOD PRESSURE: 80 MMHG | HEIGHT: 62 IN

## 2018-10-05 DIAGNOSIS — G43.009 MIGRAINE WITHOUT AURA AND WITHOUT STATUS MIGRAINOSUS, NOT INTRACTABLE: Primary | ICD-10-CM

## 2018-10-05 DIAGNOSIS — R51.9 CHRONIC DAILY HEADACHE: ICD-10-CM

## 2018-10-05 PROCEDURE — 99213 OFFICE O/P EST LOW 20 MIN: CPT | Performed by: PHYSICIAN ASSISTANT

## 2018-10-05 RX ORDER — PROCHLORPERAZINE MALEATE 10 MG
10 TABLET ORAL EVERY 6 HOURS PRN
Qty: 30 TABLET | Refills: 1 | Status: SHIPPED | OUTPATIENT
Start: 2018-10-05 | End: 2020-10-30 | Stop reason: SDUPTHER

## 2018-10-05 RX ORDER — INDOMETHACIN 25 MG/1
50 CAPSULE ORAL 3 TIMES DAILY PRN
Qty: 60 CAPSULE | Refills: 1 | Status: SHIPPED | OUTPATIENT
Start: 2018-10-05 | End: 2019-02-08 | Stop reason: SDUPTHER

## 2018-10-05 NOTE — PROGRESS NOTES
Subjective   Ml Macdonald is a 53 y.o. female is here today for follow-up.    Migraine    This is a chronic problem. The current episode started more than 1 year ago. The problem occurs intermittently. The problem has been gradually improving. The pain is located in the bilateral, retro-orbital, temporal, parietal, occipital and frontal region. The pain does not radiate. The pain quality is similar to prior headaches. The quality of the pain is described as aching, throbbing and pulsating. The pain is severe. Associated symptoms include dizziness, nausea, phonophobia, photophobia and vomiting. Pertinent negatives include no weakness. The symptoms are aggravated by activity, bright light, fatigue, emotional stress and noise. She has tried acetaminophen, antidepressants, beta blockers, Excedrin, darkened room, NSAIDs, injectable narcotics, oral narcotics and triptans for the symptoms. The treatment provided significant relief. Her past medical history is significant for migraine headaches.   Headache    This is a chronic problem. The current episode started more than 1 year ago. The problem occurs intermittently. The problem has been gradually improving. The pain is located in the bilateral region. The pain does not radiate. The pain quality is similar to prior headaches. The quality of the pain is described as aching. The pain is moderate. Associated symptoms include dizziness, nausea, phonophobia, photophobia and vomiting. Pertinent negatives include no weakness. The symptoms are aggravated by activity, bright light, fatigue, emotional stress, noise, hunger and work. She has tried acetaminophen, antidepressants, beta blockers, Excedrin, darkened room, NSAIDs, injectable narcotics, oral narcotics and triptans for the symptoms. The treatment provided significant relief. Her past medical history is significant for migraine headaches.       The following portions of the patient's history were reviewed and updated as  appropriate: allergies, current medications, past family history, past medical history, past social history, past surgical history and problem list.    Review of Systems   Constitutional: Positive for fatigue.   HENT: Negative for trouble swallowing.    Eyes: Positive for photophobia.   Respiratory: Negative.    Cardiovascular: Negative.    Gastrointestinal: Positive for nausea and vomiting.   Endocrine: Negative.    Genitourinary: Negative.    Musculoskeletal: Negative.    Skin: Negative.    Allergic/Immunologic: Negative.    Neurological: Positive for dizziness and headaches. Negative for weakness.   Hematological: Negative.    Psychiatric/Behavioral: Negative.        Objective   Physical Exam   Constitutional: She is oriented to person, place, and time. Vital signs are normal. She appears well-developed and well-nourished. No distress.   HENT:   Head: Normocephalic and atraumatic.   Right Ear: Hearing, tympanic membrane, external ear and ear canal normal.   Left Ear: Hearing, tympanic membrane, external ear and ear canal normal.   Nose: Nose normal.   Mouth/Throat: Uvula is midline, oropharynx is clear and moist and mucous membranes are normal.   Eyes: Pupils are equal, round, and reactive to light. Conjunctivae, EOM and lids are normal. No scleral icterus.   Fundoscopic exam:       The right eye shows no hemorrhage and no papilledema. The right eye shows red reflex.        The left eye shows no hemorrhage and no papilledema. The left eye shows red reflex.   Neck: Normal range of motion and phonation normal. No spinous process tenderness and no muscular tenderness present. Carotid bruit is not present. Normal range of motion present. No thyroid mass and no thyromegaly present.   Cardiovascular: Normal rate, regular rhythm, S1 normal, S2 normal and normal heart sounds.    No murmur heard.  Pulmonary/Chest: Effort normal and breath sounds normal. No stridor. No respiratory distress. She has no wheezes. She has no  rales.   Musculoskeletal: Normal range of motion. She exhibits no edema or tenderness.   Lymphadenopathy:     She has no cervical adenopathy.   Neurological: She is alert and oriented to person, place, and time. She has normal strength and normal reflexes. She displays no atrophy and no tremor. No cranial nerve deficit or sensory deficit. She exhibits normal muscle tone. She displays a negative Romberg sign. Coordination and gait normal. GCS eye subscore is 4. GCS verbal subscore is 5. GCS motor subscore is 6.   Reflex Scores:       Tricep reflexes are 2+ on the right side and 2+ on the left side.       Bicep reflexes are 2+ on the right side and 2+ on the left side.       Brachioradialis reflexes are 2+ on the right side and 2+ on the left side.       Patellar reflexes are 2+ on the right side and 2+ on the left side.       Achilles reflexes are 2+ on the right side and 2+ on the left side.  Skin: Skin is warm, dry and intact. No ecchymosis, no lesion and no rash noted. No cyanosis. Nails show no clubbing.   Psychiatric: She has a normal mood and affect. Her speech is normal and behavior is normal. Judgment and thought content normal. Cognition and memory are normal.   Nursing note and vitals reviewed.        Assessment/Plan   Ml was seen today for migraine.    Diagnoses and all orders for this visit:    Migraine without aura and without status migrainosus, not intractable  -     indomethacin (INDOCIN) 25 MG capsule; Take 2 capsules by mouth 3 (Three) Times a Day As Needed (Headache).  -     prochlorperazine (COMPAZINE) 10 MG tablet; Take 1 tablet by mouth Every 6 (Six) Hours As Needed for Nausea or Vomiting (migraine).    Chronic daily headache  -     indomethacin (INDOCIN) 25 MG capsule; Take 2 capsules by mouth 3 (Three) Times a Day As Needed (Headache).      The patient has had substantial improvement in both her daily headaches and migraine headaches.  Her migraines since last seen and involved nausea with  vomiting and we have suggested the addition of Compazine which she can take concurrently with Imitrex.    10 minutes of a 15 minute outpatient visit was spent in counseling and coordination of care with the patient and her family today.  Questions regarding medication recommendations, non-medication treatment of migraines and her current medication recommendations were reviewed in detail.    Dictated utilizing Dragon dictation.

## 2019-02-08 ENCOUNTER — OFFICE VISIT (OUTPATIENT)
Dept: NEUROLOGY | Facility: CLINIC | Age: 54
End: 2019-02-08

## 2019-02-08 VITALS
HEIGHT: 62 IN | DIASTOLIC BLOOD PRESSURE: 82 MMHG | SYSTOLIC BLOOD PRESSURE: 124 MMHG | WEIGHT: 164 LBS | BODY MASS INDEX: 30.18 KG/M2 | HEART RATE: 80 BPM

## 2019-02-08 DIAGNOSIS — G43.119 INTRACTABLE MIGRAINE WITH AURA WITHOUT STATUS MIGRAINOSUS: Primary | ICD-10-CM

## 2019-02-08 DIAGNOSIS — G43.009 MIGRAINE WITHOUT AURA AND WITHOUT STATUS MIGRAINOSUS, NOT INTRACTABLE: ICD-10-CM

## 2019-02-08 DIAGNOSIS — R51.9 CHRONIC DAILY HEADACHE: ICD-10-CM

## 2019-02-08 PROCEDURE — 99214 OFFICE O/P EST MOD 30 MIN: CPT | Performed by: PHYSICIAN ASSISTANT

## 2019-02-08 RX ORDER — MEMANTINE HYDROCHLORIDE 10 MG/1
10 TABLET ORAL 2 TIMES DAILY
Qty: 180 TABLET | Refills: 1 | Status: SHIPPED | OUTPATIENT
Start: 2019-02-08 | End: 2019-08-24 | Stop reason: SDUPTHER

## 2019-02-08 RX ORDER — INDOMETHACIN 25 MG/1
50 CAPSULE ORAL 3 TIMES DAILY PRN
Qty: 60 CAPSULE | Refills: 1 | Status: SHIPPED | OUTPATIENT
Start: 2019-02-08 | End: 2019-11-24 | Stop reason: SDUPTHER

## 2019-02-08 RX ORDER — GALCANEZUMAB 120 MG/ML
240 INJECTION, SOLUTION SUBCUTANEOUS ONCE
Qty: 2 PEN | Refills: 0 | Status: SHIPPED | OUTPATIENT
Start: 2019-02-08 | End: 2019-03-13 | Stop reason: SDUPTHER

## 2019-02-08 NOTE — PROGRESS NOTES
Subjective   Ml Macdonald is a 54 y.o. female is here today for follow-up.    Migraine    This is a chronic problem. The current episode started more than 1 year ago. The problem occurs intermittently. The problem has been unchanged. The pain is located in the bilateral, retro-orbital, temporal, parietal, occipital and frontal region. The pain does not radiate. The pain quality is similar to prior headaches. The quality of the pain is described as aching, throbbing and pulsating. The pain is severe. Associated symptoms include dizziness, nausea, phonophobia, photophobia and vomiting. Pertinent negatives include no weakness. The symptoms are aggravated by activity, bright light, fatigue, emotional stress and noise. She has tried acetaminophen, antidepressants, beta blockers, Excedrin, darkened room, NSAIDs, injectable narcotics, oral narcotics and triptans for the symptoms. The treatment provided mild relief. Her past medical history is significant for migraine headaches.   Headache    This is a chronic problem. The current episode started more than 1 year ago. The problem occurs intermittently. The problem has been unchanged. The pain is located in the bilateral region. The pain does not radiate. The pain quality is similar to prior headaches. The quality of the pain is described as aching. The pain is moderate. Associated symptoms include dizziness, nausea, phonophobia, photophobia and vomiting. Pertinent negatives include no weakness. The symptoms are aggravated by activity, bright light, fatigue, emotional stress, noise, hunger and work. She has tried acetaminophen, antidepressants, beta blockers, Excedrin, darkened room, NSAIDs, injectable narcotics, oral narcotics and triptans for the symptoms. The treatment provided moderate relief. Her past medical history is significant for migraine headaches.       The following portions of the patient's history were reviewed and updated as appropriate: allergies, current  medications, past family history, past medical history, past social history, past surgical history and problem list.    Review of Systems   Constitutional: Positive for fatigue.   HENT: Negative for trouble swallowing.    Eyes: Positive for photophobia.   Respiratory: Negative.    Cardiovascular: Negative.    Gastrointestinal: Positive for nausea and vomiting.   Endocrine: Negative.    Genitourinary: Negative.    Musculoskeletal: Negative.    Skin: Negative.    Allergic/Immunologic: Negative.    Neurological: Positive for dizziness and headaches. Negative for weakness.   Hematological: Negative.    Psychiatric/Behavioral: Negative.        Objective   Physical Exam   Constitutional: She is oriented to person, place, and time. Vital signs are normal. She appears well-developed and well-nourished. No distress.   HENT:   Head: Normocephalic and atraumatic.   Right Ear: Hearing, tympanic membrane, external ear and ear canal normal.   Left Ear: Hearing, tympanic membrane, external ear and ear canal normal.   Nose: Nose normal.   Mouth/Throat: Uvula is midline, oropharynx is clear and moist and mucous membranes are normal.   Eyes: Conjunctivae, EOM and lids are normal. Pupils are equal, round, and reactive to light. No scleral icterus.   Fundoscopic exam:       The right eye shows no hemorrhage and no papilledema. The right eye shows red reflex.        The left eye shows no hemorrhage and no papilledema. The left eye shows red reflex.   Neck: Normal range of motion and phonation normal. Carotid bruit is not present. No thyroid mass present.   Cardiovascular: Normal rate, regular rhythm, S1 normal, S2 normal and normal heart sounds.   No murmur heard.  Pulmonary/Chest: Effort normal and breath sounds normal. No stridor. No respiratory distress. She has no wheezes. She has no rales.   Musculoskeletal: Normal range of motion. She exhibits no edema or tenderness.   Lymphadenopathy:     She has no cervical adenopathy.    Neurological: She is alert and oriented to person, place, and time. She has normal strength and normal reflexes. She displays no atrophy and no tremor. No cranial nerve deficit or sensory deficit. She exhibits normal muscle tone. She displays a negative Romberg sign. Coordination and gait normal. GCS eye subscore is 4. GCS verbal subscore is 5. GCS motor subscore is 6.   Reflex Scores:       Tricep reflexes are 2+ on the right side and 2+ on the left side.       Bicep reflexes are 2+ on the right side and 2+ on the left side.       Brachioradialis reflexes are 2+ on the right side and 2+ on the left side.       Patellar reflexes are 2+ on the right side and 2+ on the left side.       Achilles reflexes are 2+ on the right side and 2+ on the left side.  Skin: Skin is warm, dry and intact. No ecchymosis, no lesion and no rash noted. No cyanosis. Nails show no clubbing.   Psychiatric: She has a normal mood and affect. Her speech is normal and behavior is normal. Judgment and thought content normal. Cognition and memory are normal.   Nursing note and vitals reviewed.        Assessment/Plan   Ml was seen today for migraine and headache.    Diagnoses and all orders for this visit:    Intractable migraine with aura without status migrainosus  -     memantine (NAMENDA) 10 MG tablet; Take 1 tablet by mouth 2 (Two) Times a Day.  -     EMGALITY 120 MG/ML prefilled syringe; Inject 2 mL under the skin into the appropriate area as directed 1 (One) Time for 1 dose.  -     galcanezumab-gnlm (EMGALITY) 120 MG/ML prefilled syringe; Inject 1 mL under the skin into the appropriate area as directed Every 30 (Thirty) Days.    Chronic daily headache  -     indomethacin (INDOCIN) 25 MG capsule; Take 2 capsules by mouth 3 (Three) Times a Day As Needed (Headache).    Migraine without aura and without status migrainosus, not intractable  -     indomethacin (INDOCIN) 25 MG capsule; Take 2 capsules by mouth 3 (Three) Times a Day As Needed  (Headache).    We will add Emgality to her present medication regimen.  The autoinjector is demonstrated to the patient today.  No other changes are made in her medication regimen.    20 minutes of a 25-minute outpatient visit was spent in counseling and coordination of care.  Findings, recommendations, medications and patient questions are reviewed in detail.    Dictated utilizing Dragon dictation.

## 2019-03-13 DIAGNOSIS — G43.119 INTRACTABLE MIGRAINE WITH AURA WITHOUT STATUS MIGRAINOSUS: ICD-10-CM

## 2019-03-13 RX ORDER — GALCANEZUMAB 120 MG/ML
120 INJECTION, SOLUTION SUBCUTANEOUS
Qty: 1 ML | Refills: 5 | Status: SHIPPED | OUTPATIENT
Start: 2019-03-13 | End: 2019-05-17 | Stop reason: SDUPTHER

## 2019-05-17 ENCOUNTER — OFFICE VISIT (OUTPATIENT)
Dept: NEUROLOGY | Facility: CLINIC | Age: 54
End: 2019-05-17

## 2019-05-17 VITALS
OXYGEN SATURATION: 97 % | BODY MASS INDEX: 28.53 KG/M2 | WEIGHT: 161 LBS | DIASTOLIC BLOOD PRESSURE: 80 MMHG | SYSTOLIC BLOOD PRESSURE: 100 MMHG | RESPIRATION RATE: 18 BRPM | HEART RATE: 98 BPM | HEIGHT: 63 IN

## 2019-05-17 DIAGNOSIS — G43.109 MIGRAINE WITH AURA AND WITHOUT STATUS MIGRAINOSUS, NOT INTRACTABLE: Primary | ICD-10-CM

## 2019-05-17 DIAGNOSIS — R51.9 CHRONIC DAILY HEADACHE: ICD-10-CM

## 2019-05-17 PROCEDURE — 99214 OFFICE O/P EST MOD 30 MIN: CPT | Performed by: PHYSICIAN ASSISTANT

## 2019-05-17 RX ORDER — CITALOPRAM 20 MG/1
40 TABLET ORAL DAILY
Refills: 0 | COMMUNITY
Start: 2019-05-06 | End: 2021-12-21 | Stop reason: SDUPTHER

## 2019-05-17 RX ORDER — POTASSIUM CHLORIDE 750 MG/1
1 CAPSULE, EXTENDED RELEASE ORAL AS NEEDED
Refills: 2 | COMMUNITY
Start: 2019-05-07

## 2019-05-17 RX ORDER — ERGOCALCIFEROL 1.25 MG/1
1 CAPSULE ORAL WEEKLY
COMMUNITY
Start: 2019-03-11

## 2019-07-08 DIAGNOSIS — G43.119 INTRACTABLE MIGRAINE WITH AURA WITHOUT STATUS MIGRAINOSUS: ICD-10-CM

## 2019-07-08 RX ORDER — TOPIRAMATE 200 MG/1
200 CAPSULE, EXTENDED RELEASE ORAL DAILY
Qty: 30 CAPSULE | Refills: 5 | Status: SHIPPED | OUTPATIENT
Start: 2019-07-08 | End: 2020-01-20 | Stop reason: SDUPTHER

## 2019-08-18 DIAGNOSIS — G43.119 INTRACTABLE MIGRAINE WITH AURA WITHOUT STATUS MIGRAINOSUS: ICD-10-CM

## 2019-08-19 RX ORDER — GALCANEZUMAB 120 MG/ML
120 INJECTION, SOLUTION SUBCUTANEOUS
Qty: 1 PEN | Refills: 5 | Status: SHIPPED | OUTPATIENT
Start: 2019-08-19 | End: 2020-03-06

## 2019-08-24 DIAGNOSIS — G43.119 INTRACTABLE MIGRAINE WITH AURA WITHOUT STATUS MIGRAINOSUS: ICD-10-CM

## 2019-08-26 RX ORDER — MEMANTINE HYDROCHLORIDE 10 MG/1
TABLET ORAL
Qty: 180 TABLET | Refills: 1 | Status: SHIPPED | OUTPATIENT
Start: 2019-08-26 | End: 2020-03-06 | Stop reason: SDUPTHER

## 2019-09-06 ENCOUNTER — OFFICE VISIT (OUTPATIENT)
Dept: NEUROLOGY | Facility: CLINIC | Age: 54
End: 2019-09-06

## 2019-09-06 VITALS
WEIGHT: 169 LBS | BODY MASS INDEX: 29.95 KG/M2 | DIASTOLIC BLOOD PRESSURE: 70 MMHG | HEART RATE: 78 BPM | SYSTOLIC BLOOD PRESSURE: 122 MMHG | HEIGHT: 63 IN

## 2019-09-06 DIAGNOSIS — G43.109 MIGRAINE WITH AURA AND WITHOUT STATUS MIGRAINOSUS, NOT INTRACTABLE: Primary | ICD-10-CM

## 2019-09-06 DIAGNOSIS — R51.9 CHRONIC DAILY HEADACHE: ICD-10-CM

## 2019-09-06 PROCEDURE — 99213 OFFICE O/P EST LOW 20 MIN: CPT | Performed by: PHYSICIAN ASSISTANT

## 2019-09-06 RX ORDER — CHOLECALCIFEROL (VITAMIN D3) 125 MCG
5 CAPSULE ORAL AS NEEDED
COMMUNITY

## 2019-09-06 RX ORDER — TIZANIDINE 4 MG/1
4 TABLET ORAL 3 TIMES DAILY
Qty: 30 TABLET | Refills: 1 | Status: SHIPPED | OUTPATIENT
Start: 2019-09-06 | End: 2020-01-21

## 2019-10-06 NOTE — PROGRESS NOTES
Maryuri Macdonald is a 54 y.o. female is here today for follow-up.    Improvement on the present regimen.      Migraine    This is a chronic problem. The current episode started more than 1 year ago. The problem occurs intermittently. The problem has been gradually improving. The pain is located in the bilateral, retro-orbital, temporal, parietal, occipital and frontal region. The pain does not radiate. The pain quality is similar to prior headaches. The quality of the pain is described as aching, throbbing and pulsating. The pain is severe. Associated symptoms include dizziness, nausea, phonophobia, photophobia and vomiting. Pertinent negatives include no weakness. The symptoms are aggravated by activity, bright light, fatigue, emotional stress and noise. She has tried acetaminophen, antidepressants, beta blockers, Excedrin, darkened room, NSAIDs, injectable narcotics, oral narcotics and triptans for the symptoms. The treatment provided significant relief. Her past medical history is significant for migraine headaches.       The following portions of the patient's history were reviewed and updated as appropriate: allergies, current medications, past family history, past medical history, past social history, past surgical history and problem list.    Review of Systems   Constitutional: Positive for fatigue.   HENT: Negative for trouble swallowing.    Eyes: Positive for photophobia.   Respiratory: Negative.    Cardiovascular: Negative.    Gastrointestinal: Positive for nausea and vomiting.   Endocrine: Negative.    Genitourinary: Negative.    Musculoskeletal: Negative.    Skin: Negative.    Allergic/Immunologic: Negative.    Neurological: Positive for dizziness and headaches. Negative for weakness.   Hematological: Negative.    Psychiatric/Behavioral: Negative.        Objective   Physical Exam   Constitutional: She is oriented to person, place, and time. Vital signs are normal. She appears well-developed and  well-nourished. No distress.   HENT:   Head: Normocephalic and atraumatic.   Right Ear: Hearing and external ear normal.   Left Ear: Hearing and external ear normal.   Nose: Nose normal.   Mouth/Throat: Uvula is midline, oropharynx is clear and moist and mucous membranes are normal.   Eyes: Conjunctivae, EOM and lids are normal. Pupils are equal, round, and reactive to light. No scleral icterus.   Fundoscopic exam:       The right eye shows no hemorrhage and no papilledema. The right eye shows red reflex.        The left eye shows no hemorrhage and no papilledema. The left eye shows red reflex.   Neck: Trachea normal, normal range of motion and phonation normal. No JVD present. Carotid bruit is not present.   Cardiovascular: Normal rate and regular rhythm.   Pulmonary/Chest: Effort normal and breath sounds normal.   Musculoskeletal: Normal range of motion. She exhibits no edema or tenderness.   Lymphadenopathy:     She has no cervical adenopathy.   Neurological: She is alert and oriented to person, place, and time. She has normal strength and normal reflexes. She displays no atrophy and no tremor. No cranial nerve deficit or sensory deficit. She exhibits normal muscle tone. She displays a negative Romberg sign. Coordination and gait normal. GCS eye subscore is 4. GCS verbal subscore is 5. GCS motor subscore is 6.   Reflex Scores:       Tricep reflexes are 2+ on the right side and 2+ on the left side.       Bicep reflexes are 2+ on the right side and 2+ on the left side.       Brachioradialis reflexes are 2+ on the right side and 2+ on the left side.       Patellar reflexes are 2+ on the right side and 2+ on the left side.       Achilles reflexes are 2+ on the right side and 2+ on the left side.  Skin: Skin is warm, dry and intact.   Psychiatric: She has a normal mood and affect. Her speech is normal and behavior is normal. Judgment and thought content normal. Cognition and memory are normal.   Nursing note and  vitals reviewed.        Assessment/Plan   Ml was seen today for migraine and headache.    Diagnoses and all orders for this visit:    Migraine with aura and without status migrainosus, not intractable  -     tiZANidine (ZANAFLEX) 4 MG tablet; Take 1 tablet by mouth 3 (Three) Times a Day.    Chronic daily headache  -     tiZANidine (ZANAFLEX) 4 MG tablet; Take 1 tablet by mouth 3 (Three) Times a Day.    She may use tizanidine on as-needed basis for her muscular skeletal complaints.  Otherwise no changes in her present regimen.  She will return in 6 months.    10 minutes of a 15-minute outpatient visit was spent in counseling and coordination of care today.    Dictated utilizing Dragon dictation.

## 2019-11-24 DIAGNOSIS — G43.009 MIGRAINE WITHOUT AURA AND WITHOUT STATUS MIGRAINOSUS, NOT INTRACTABLE: ICD-10-CM

## 2019-11-24 DIAGNOSIS — R51.9 CHRONIC DAILY HEADACHE: ICD-10-CM

## 2019-11-25 RX ORDER — INDOMETHACIN 25 MG/1
CAPSULE ORAL
Qty: 60 CAPSULE | Refills: 1 | Status: SHIPPED | OUTPATIENT
Start: 2019-11-25 | End: 2020-05-04

## 2020-01-20 DIAGNOSIS — G43.119 INTRACTABLE MIGRAINE WITH AURA WITHOUT STATUS MIGRAINOSUS: ICD-10-CM

## 2020-01-20 DIAGNOSIS — G43.109 MIGRAINE WITH AURA AND WITHOUT STATUS MIGRAINOSUS, NOT INTRACTABLE: ICD-10-CM

## 2020-01-20 DIAGNOSIS — R51.9 CHRONIC DAILY HEADACHE: ICD-10-CM

## 2020-01-20 RX ORDER — TOPIRAMATE 200 MG/1
200 CAPSULE, EXTENDED RELEASE ORAL DAILY
Qty: 30 CAPSULE | Refills: 5 | Status: SHIPPED | OUTPATIENT
Start: 2020-01-20 | End: 2020-07-28 | Stop reason: SDUPTHER

## 2020-01-21 RX ORDER — TIZANIDINE 4 MG/1
TABLET ORAL
Qty: 30 TABLET | Refills: 1 | Status: SHIPPED | OUTPATIENT
Start: 2020-01-21 | End: 2020-05-04

## 2020-03-06 ENCOUNTER — OFFICE VISIT (OUTPATIENT)
Dept: NEUROLOGY | Facility: CLINIC | Age: 55
End: 2020-03-06

## 2020-03-06 VITALS
HEART RATE: 72 BPM | DIASTOLIC BLOOD PRESSURE: 80 MMHG | SYSTOLIC BLOOD PRESSURE: 130 MMHG | HEIGHT: 63 IN | BODY MASS INDEX: 31.89 KG/M2 | WEIGHT: 180 LBS

## 2020-03-06 DIAGNOSIS — G43.109 MIGRAINE WITH AURA AND WITHOUT STATUS MIGRAINOSUS, NOT INTRACTABLE: ICD-10-CM

## 2020-03-06 DIAGNOSIS — R51.9 CHRONIC DAILY HEADACHE: ICD-10-CM

## 2020-03-06 PROCEDURE — 64450 NJX AA&/STRD OTHER PN/BRANCH: CPT | Performed by: PHYSICIAN ASSISTANT

## 2020-03-06 PROCEDURE — 64405 NJX AA&/STRD GR OCPL NRV: CPT | Performed by: PHYSICIAN ASSISTANT

## 2020-03-06 PROCEDURE — 99213 OFFICE O/P EST LOW 20 MIN: CPT | Performed by: PHYSICIAN ASSISTANT

## 2020-03-06 RX ORDER — METHYLPREDNISOLONE ACETATE 40 MG/ML
12.3 INJECTION, SUSPENSION INTRA-ARTICULAR; INTRALESIONAL; INTRAMUSCULAR; SOFT TISSUE ONCE
Status: COMPLETED | OUTPATIENT
Start: 2020-03-06 | End: 2020-03-06

## 2020-03-06 RX ORDER — MEMANTINE HYDROCHLORIDE 10 MG/1
10 TABLET ORAL 2 TIMES DAILY
Qty: 180 TABLET | Refills: 3 | Status: SHIPPED | OUTPATIENT
Start: 2020-03-06 | End: 2020-10-30 | Stop reason: SDUPTHER

## 2020-03-06 RX ORDER — BUPIVACAINE HYDROCHLORIDE 5 MG/ML
0.7 INJECTION, SOLUTION PERINEURAL ONCE
Status: COMPLETED | OUTPATIENT
Start: 2020-03-06 | End: 2020-03-06

## 2020-03-06 RX ORDER — GALCANEZUMAB 120 MG/ML
120 INJECTION, SOLUTION SUBCUTANEOUS
Qty: 1 PEN | Refills: 11 | Status: SHIPPED | OUTPATIENT
Start: 2020-03-06 | End: 2021-02-12 | Stop reason: SDUPTHER

## 2020-03-06 RX ADMIN — BUPIVACAINE HYDROCHLORIDE 0.7 ML: 5 INJECTION, SOLUTION PERINEURAL at 11:11

## 2020-03-06 RX ADMIN — METHYLPREDNISOLONE ACETATE 12.3 MG: 40 INJECTION, SUSPENSION INTRA-ARTICULAR; INTRALESIONAL; INTRAMUSCULAR; SOFT TISSUE at 11:12

## 2020-03-06 NOTE — PROGRESS NOTES
"Procedure   Nerve Block  Date/Time: 3/6/2020 11:16 AM  Performed by: Suresh Palacios PA  Authorized by: Suresh Palacios PA   Consent: Verbal consent obtained. Written consent obtained.  Risks and benefits: risks, benefits and alternatives were discussed  Consent given by: patient  Patient understanding: patient states understanding of the procedure being performed  Patient consent: the patient's understanding of the procedure matches consent given  Procedure consent: procedure consent matches procedure scheduled  Relevant documents: relevant documents present and verified  Test results: test results not available  Site marked: the operative site was not marked  Imaging studies: imaging studies not available  Required items: required blood products, implants, devices, and special equipment available  Patient identity confirmed: verbally with patient  Time out: Immediately prior to procedure a \"time out\" was called to verify the correct patient, procedure, equipment, support staff and site/side marked as required.  Indications: pain relief  Body area: head  Nerve: greater occipital  Laterality: right    Sedation:  Patient sedated: no    Preparation: Patient was prepped and draped in the usual sterile fashion.  Patient position: sitting  Needle size: 27 G  Location technique: anatomical landmarks  Local Anesthetic: bupivacaine 0.5% without epinephrine  Anesthetic total: 0.7 mL  Outcome: pain improved  Patient tolerance: Patient tolerated the procedure well with no immediate complications           "

## 2020-03-06 NOTE — PROGRESS NOTES
"Procedure   Nerve Block  Date/Time: 3/6/2020 11:14 AM  Performed by: Suresh Palacios PA  Authorized by: Suresh Palacios PA   Consent: Verbal consent obtained. Written consent obtained.  Risks and benefits: risks, benefits and alternatives were discussed  Consent given by: patient  Patient understanding: patient states understanding of the procedure being performed  Patient consent: the patient's understanding of the procedure matches consent given  Procedure consent: procedure consent matches procedure scheduled  Relevant documents: relevant documents present and verified  Test results: test results not available  Site marked: the operative site was not marked  Imaging studies: imaging studies not available  Required items: required blood products, implants, devices, and special equipment available  Patient identity confirmed: verbally with patient  Time out: Immediately prior to procedure a \"time out\" was called to verify the correct patient, procedure, equipment, support staff and site/side marked as required.  Indications: pain relief  Body area: head  Nerve: lesser occipital  Laterality: right    Sedation:  Patient sedated: no    Preparation: Patient was prepped and draped in the usual sterile fashion.  Patient position: sitting  Needle size: 27 G  Location technique: anatomical landmarks  Local Anesthetic: bupivacaine 0.5% without epinephrine  Anesthetic total: 0.7 mL  Outcome: pain improved  Patient tolerance: Patient tolerated the procedure well with no immediate complications           "

## 2020-03-06 NOTE — PROGRESS NOTES
Maryuri Macdonald is a 55 y.o. female is here today for follow-up.    She initially had improvement with Emgality.  She lost access to Emgality and has gone a couple months without it with increased symptoms.  She has an evolving headache today.  Her headache today is right-sided retro-orbital, suboccipital and occipital asymmetric to the right.  Is associated with photophobia phonophobia and dizziness.    Migraine    This is a chronic problem. The current episode started more than 1 year ago. The problem occurs intermittently. The problem has been gradually improving. The pain is located in the bilateral, retro-orbital, temporal, parietal, occipital and frontal region. The pain does not radiate. The pain quality is similar to prior headaches. The quality of the pain is described as aching, throbbing and pulsating. The pain is severe. Associated symptoms include dizziness, nausea, neck pain, phonophobia, photophobia and vomiting. Pertinent negatives include no weakness. The symptoms are aggravated by activity, bright light, fatigue, emotional stress and noise. She has tried acetaminophen, antidepressants, beta blockers, Excedrin, darkened room, NSAIDs, injectable narcotics, oral narcotics and triptans for the symptoms. The treatment provided significant relief. Her past medical history is significant for migraine headaches.       The following portions of the patient's history were reviewed and updated as appropriate: allergies, current medications, past family history, past medical history, past social history, past surgical history and problem list.    Review of Systems   Constitutional: Positive for fatigue.   HENT: Negative for trouble swallowing.    Eyes: Positive for photophobia and visual disturbance.   Respiratory: Negative.    Cardiovascular: Negative.    Gastrointestinal: Positive for nausea and vomiting.   Endocrine: Negative.    Genitourinary: Negative.    Musculoskeletal: Positive for neck  pain.   Skin: Negative.    Allergic/Immunologic: Negative.    Neurological: Positive for dizziness and headaches. Negative for weakness.   Hematological: Negative.    Psychiatric/Behavioral: Negative.        Objective   Physical Exam   Constitutional: She is oriented to person, place, and time. Vital signs are normal.   HENT:   Head: Normocephalic.   Right Ear: Hearing and external ear normal.   Left Ear: Hearing and external ear normal.   Nose: Nose normal.   Mouth/Throat: Uvula is midline, oropharynx is clear and moist and mucous membranes are normal.   Eyes: Pupils are equal, round, and reactive to light. Conjunctivae, EOM and lids are normal. No scleral icterus.   Neck: Trachea normal, normal range of motion and phonation normal. No JVD present. Muscular tenderness present. Carotid bruit is not present.   Cardiovascular: Normal rate, regular rhythm and normal heart sounds.   Pulmonary/Chest: Effort normal and breath sounds normal.   Musculoskeletal: Normal range of motion. She exhibits no edema or tenderness.   Lymphadenopathy:     She has no cervical adenopathy.   Neurological: She is oriented to person, place, and time. She has normal strength and normal reflexes. She displays no atrophy and no tremor. No cranial nerve deficit or sensory deficit. She exhibits normal muscle tone. She displays a negative Romberg sign. Coordination and gait normal. GCS eye subscore is 4. GCS verbal subscore is 5. GCS motor subscore is 6.   Reflex Scores:       Tricep reflexes are 2+ on the right side and 2+ on the left side.       Bicep reflexes are 2+ on the right side and 2+ on the left side.       Brachioradialis reflexes are 2+ on the right side and 2+ on the left side.       Patellar reflexes are 2+ on the right side and 2+ on the left side.       Achilles reflexes are 2+ on the right side and 2+ on the left side.  Skin: Skin is warm, dry and intact.   Psychiatric: She has a normal mood and affect. Her speech is normal and  behavior is normal. Judgment and thought content normal. Cognition and memory are normal.   Nursing note and vitals reviewed.        Assessment/Plan   Ml was seen today for migraine and headache.    Diagnoses and all orders for this visit:    Migraine with aura and without status migrainosus, not intractable  -     EMGALITY 120 MG/ML prefilled syringe; Inject 1 mL under the skin into the appropriate area as directed Every 30 (Thirty) Days.  -     Ubrogepant (ubrogepant) 50 MG tablet; Take 50 mg by mouth As Needed (Migraine). May repeat in 2 hours if needed, max 2 tabs in 24 hours.  -     memantine (NAMENDA) 10 MG tablet; Take 1 tablet by mouth 2 (Two) Times a Day.  -     methylPREDNISolone acetate (DEPO-medrol) injection 12.3 mg  -     bupivacaine (MARCAINE) 0.5 % injection 0.7 mL  -     Nerve Block  -     Nerve Block    Chronic daily headache    Dysfunction recommendations were reviewed with the patient.  The patient wishes to proceed with nerve blocks which are documented below.  I do believe the patient is a good candidate for resuming Emgality.  This is discussed in detail with the patient.  Patient is also an excellent candidate for Ubrelvey 50 mg as needed for breakthrough migraines.  This medication is also discussed in detail with the patient.  The patient's condition, recommendations, expectations as well as the patient's questions and concerns are reviewed in detail.  10 minutes of a 15-minute outpatient visit was spent in counseling and coordination of care today.

## 2020-05-02 DIAGNOSIS — G43.109 MIGRAINE WITH AURA AND WITHOUT STATUS MIGRAINOSUS, NOT INTRACTABLE: ICD-10-CM

## 2020-05-02 DIAGNOSIS — R51.9 CHRONIC DAILY HEADACHE: ICD-10-CM

## 2020-05-02 DIAGNOSIS — G43.009 MIGRAINE WITHOUT AURA AND WITHOUT STATUS MIGRAINOSUS, NOT INTRACTABLE: ICD-10-CM

## 2020-05-04 RX ORDER — INDOMETHACIN 25 MG/1
CAPSULE ORAL
Qty: 60 CAPSULE | Refills: 1 | Status: SHIPPED | OUTPATIENT
Start: 2020-05-04 | End: 2020-10-30 | Stop reason: SDUPTHER

## 2020-05-04 RX ORDER — TIZANIDINE 4 MG/1
TABLET ORAL
Qty: 30 TABLET | Refills: 1 | Status: SHIPPED | OUTPATIENT
Start: 2020-05-04 | End: 2020-10-30 | Stop reason: SDUPTHER

## 2020-07-28 DIAGNOSIS — G43.119 INTRACTABLE MIGRAINE WITH AURA WITHOUT STATUS MIGRAINOSUS: ICD-10-CM

## 2020-07-29 RX ORDER — TOPIRAMATE 200 MG/1
200 CAPSULE, EXTENDED RELEASE ORAL DAILY
Qty: 30 CAPSULE | Refills: 3 | Status: SHIPPED | OUTPATIENT
Start: 2020-07-29 | End: 2020-10-30 | Stop reason: SDUPTHER

## 2020-10-30 ENCOUNTER — OFFICE VISIT (OUTPATIENT)
Dept: NEUROLOGY | Facility: CLINIC | Age: 55
End: 2020-10-30

## 2020-10-30 VITALS
WEIGHT: 202 LBS | DIASTOLIC BLOOD PRESSURE: 80 MMHG | SYSTOLIC BLOOD PRESSURE: 136 MMHG | HEART RATE: 84 BPM | HEIGHT: 63 IN | BODY MASS INDEX: 35.79 KG/M2 | OXYGEN SATURATION: 98 %

## 2020-10-30 DIAGNOSIS — R51.9 CHRONIC DAILY HEADACHE: ICD-10-CM

## 2020-10-30 DIAGNOSIS — G43.119 INTRACTABLE MIGRAINE WITH AURA WITHOUT STATUS MIGRAINOSUS: ICD-10-CM

## 2020-10-30 PROBLEM — G43.009 MIGRAINE WITHOUT AURA AND WITHOUT STATUS MIGRAINOSUS, NOT INTRACTABLE: Status: ACTIVE | Noted: 2020-10-30

## 2020-10-30 PROCEDURE — 99214 OFFICE O/P EST MOD 30 MIN: CPT | Performed by: PHYSICIAN ASSISTANT

## 2020-10-30 RX ORDER — UBROGEPANT 100 MG/1
100 TABLET ORAL ONCE
Qty: 10 TABLET | Refills: 11 | Status: SHIPPED | OUTPATIENT
Start: 2020-10-30 | End: 2020-10-30

## 2020-10-30 RX ORDER — TIZANIDINE 4 MG/1
4 TABLET ORAL 3 TIMES DAILY PRN
Qty: 60 TABLET | Refills: 3 | Status: SHIPPED | OUTPATIENT
Start: 2020-10-30 | End: 2021-02-12 | Stop reason: SDUPTHER

## 2020-10-30 RX ORDER — TOPIRAMATE 200 MG/1
200 CAPSULE, EXTENDED RELEASE ORAL DAILY
Qty: 30 CAPSULE | Refills: 3 | Status: SHIPPED | OUTPATIENT
Start: 2020-10-30 | End: 2021-02-12 | Stop reason: SDUPTHER

## 2020-10-30 RX ORDER — INDOMETHACIN 25 MG/1
25 CAPSULE ORAL 3 TIMES DAILY PRN
Qty: 60 CAPSULE | Refills: 1 | Status: SHIPPED | OUTPATIENT
Start: 2020-10-30 | End: 2021-02-12 | Stop reason: SDUPTHER

## 2020-10-30 RX ORDER — MEMANTINE HYDROCHLORIDE 10 MG/1
10 TABLET ORAL 2 TIMES DAILY
Qty: 180 TABLET | Refills: 3 | Status: SHIPPED | OUTPATIENT
Start: 2020-10-30 | End: 2021-02-12 | Stop reason: SDUPTHER

## 2020-10-30 RX ORDER — PROCHLORPERAZINE MALEATE 10 MG
10 TABLET ORAL EVERY 6 HOURS PRN
Qty: 30 TABLET | Refills: 3 | Status: SHIPPED | OUTPATIENT
Start: 2020-10-30 | End: 2021-02-12 | Stop reason: SDUPTHER

## 2020-10-30 RX ORDER — FUROSEMIDE 20 MG/1
20 TABLET ORAL DAILY
COMMUNITY
End: 2022-10-11

## 2020-11-15 NOTE — PROGRESS NOTES
Maryuri Macdonald is a 55 y.o. female is here today for follow-up.    She has an evolving headache today.  Her headache today is right-sided retro-orbital, suboccipital and occipital asymmetric to the right.  Is associated with photophobia phonophobia and dizziness.  She has continual breakthrough headaches despite near maximal medical therapy.    Migraine   This is a chronic problem. The current episode started more than 1 year ago. The problem occurs intermittently. The problem has been unchanged. The pain is located in the bilateral, retro-orbital, temporal, parietal, occipital and frontal region. The pain does not radiate. The pain quality is similar to prior headaches. The quality of the pain is described as aching, throbbing and pulsating. The pain is severe. Associated symptoms include dizziness, nausea, neck pain, phonophobia, photophobia and vomiting. Pertinent negatives include no weakness. The symptoms are aggravated by activity, bright light, fatigue, emotional stress and noise. She has tried acetaminophen, antidepressants, beta blockers, Excedrin, darkened room, NSAIDs, injectable narcotics, oral narcotics and triptans for the symptoms. The treatment provided significant relief. Her past medical history is significant for migraine headaches.        The following portions of the patient's history were reviewed and updated as appropriate: allergies, current medications, past family history, past medical history, past social history, past surgical history and problem list.    Review of Systems   Constitutional: Positive for activity change and fatigue.   HENT: Negative for trouble swallowing.    Eyes: Positive for photophobia and visual disturbance.   Respiratory: Positive for shortness of breath.    Cardiovascular: Negative.    Gastrointestinal: Positive for nausea and vomiting.   Endocrine: Negative.    Genitourinary: Negative.    Musculoskeletal: Positive for neck pain.   Allergic/Immunologic:  Negative.    Neurological: Positive for dizziness and headache. Negative for weakness.   Hematological: Negative.    Psychiatric/Behavioral: Positive for dysphoric mood and sleep disturbance. The patient is nervous/anxious.          Current Outpatient Medications:   •  citalopram (CeleXA) 20 MG tablet, Take 40 mg by mouth Daily., Disp: , Rfl: 0  •  EMGALITY 120 MG/ML prefilled syringe, Inject 1 mL under the skin into the appropriate area as directed Every 30 (Thirty) Days., Disp: 1 pen, Rfl: 11  •  furosemide (LASIX) 20 MG tablet, Take 20 mg by mouth Daily., Disp: , Rfl:   •  indomethacin (INDOCIN) 25 MG capsule, Take 1 capsule by mouth 3 (Three) Times a Day As Needed for Mild Pain ., Disp: 60 capsule, Rfl: 1  •  magnesium oxide (MAGOX) 400 (241.3 Mg) MG tablet tablet, Take 1 tablet by mouth Daily., Disp: , Rfl: 2  •  melatonin 5 MG tablet tablet, Take 5 mg by mouth As Needed., Disp: , Rfl:   •  memantine (NAMENDA) 10 MG tablet, Take 1 tablet by mouth 2 (Two) Times a Day., Disp: 180 tablet, Rfl: 3  •  potassium chloride (MICRO-K) 10 MEQ CR capsule, Take 1 capsule by mouth Daily., Disp: , Rfl: 2  •  prochlorperazine (COMPAZINE) 10 MG tablet, Take 1 tablet by mouth Every 6 (Six) Hours As Needed for Nausea or Vomiting (migraine)., Disp: 30 tablet, Rfl: 3  •  tiZANidine (ZANAFLEX) 4 MG tablet, Take 1 tablet by mouth 3 (Three) Times a Day As Needed for Muscle Spasms., Disp: 60 tablet, Rfl: 3  •  Trokendi  MG capsule sustained-release 24 hr, Take 200 mg by mouth Daily., Disp: 30 capsule, Rfl: 3  •  vitamin D (ERGOCALCIFEROL) 15352 units capsule capsule, 1 capsule 1 (One) Time Per Week., Disp: , Rfl:      Objective   Physical Exam  Vitals signs and nursing note reviewed.   HENT:      Head: Normocephalic.      Right Ear: Hearing and external ear normal.      Left Ear: Hearing and external ear normal.      Nose: Nose normal.      Mouth/Throat:      Pharynx: Oropharynx is clear.   Eyes:      General: Lids are normal.  Vision grossly intact. Gaze aligned appropriately. No scleral icterus.     Extraocular Movements: Extraocular movements intact.      Conjunctiva/sclera: Conjunctivae normal.      Pupils: Pupils are equal, round, and reactive to light.      Visual Fields: Right eye visual fields normal and left eye visual fields normal.   Neck:      Musculoskeletal: Normal range of motion.      Vascular: No carotid bruit or JVD.      Trachea: Trachea and phonation normal.   Cardiovascular:      Rate and Rhythm: Normal rate.      Heart sounds: Normal heart sounds.   Pulmonary:      Effort: Pulmonary effort is normal.      Breath sounds: Normal breath sounds.   Skin:     General: Skin is warm and dry.   Neurological:      Mental Status: She is alert and oriented to person, place, and time.      GCS: GCS eye subscore is 4. GCS verbal subscore is 5. GCS motor subscore is 6.      Cranial Nerves: Cranial nerves are intact.      Sensory: Sensation is intact.      Motor: Motor function is intact.      Coordination: Coordination is intact.      Gait: Gait is intact.      Deep Tendon Reflexes: Reflexes are normal and symmetric.   Psychiatric:         Attention and Perception: Attention and perception normal.         Mood and Affect: Mood and affect normal.         Speech: Speech normal.         Behavior: Behavior normal.         Thought Content: Thought content normal.         Cognition and Memory: Cognition and memory normal.         Judgment: Judgment normal.           Assessment/Plan   Diagnoses and all orders for this visit:    1. Chronic daily headache  -     tiZANidine (ZANAFLEX) 4 MG tablet; Take 1 tablet by mouth 3 (Three) Times a Day As Needed for Muscle Spasms.  Dispense: 60 tablet; Refill: 3  -     indomethacin (INDOCIN) 25 MG capsule; Take 1 capsule by mouth 3 (Three) Times a Day As Needed for Mild Pain .  Dispense: 60 capsule; Refill: 1    2. Intractable migraine with aura without status migrainosus  -     tiZANidine (ZANAFLEX) 4  MG tablet; Take 1 tablet by mouth 3 (Three) Times a Day As Needed for Muscle Spasms.  Dispense: 60 tablet; Refill: 3  -     prochlorperazine (COMPAZINE) 10 MG tablet; Take 1 tablet by mouth Every 6 (Six) Hours As Needed for Nausea or Vomiting (migraine).  Dispense: 30 tablet; Refill: 3  -     memantine (NAMENDA) 10 MG tablet; Take 1 tablet by mouth 2 (Two) Times a Day.  Dispense: 180 tablet; Refill: 3  -     indomethacin (INDOCIN) 25 MG capsule; Take 1 capsule by mouth 3 (Three) Times a Day As Needed for Mild Pain .  Dispense: 60 capsule; Refill: 1  -     Trokendi  MG capsule sustained-release 24 hr; Take 200 mg by mouth Daily.  Dispense: 30 capsule; Refill: 3  -     ubrogepant 100 MG tablet; Take 1 tablet by mouth 1 (One) Time for 1 dose.  Dispense: 10 tablet; Refill: 11  -     Ambulatory Referral to Neurology    Today's findings and recommendations were reviewed with the patient.  The patient was dancing refractory migraines with multiple medication failures as well as difficulty with restrictions placed by insurance.  She has good candidate for Botox for migraine given her frequency and intensity of headaches.  She is a better candidate for ubrogepant than triptan medications.  Emgality continues to be strongly recommended.  The patient's questions and concerns are reviewed in detail.      20 minutes of a 25 minute outpatient visit was spent in counseling and coordination of care today.           Dictated utilizing Dragon dictation.

## 2020-11-23 DIAGNOSIS — G43.719 INTRACTABLE CHRONIC MIGRAINE WITHOUT AURA AND WITHOUT STATUS MIGRAINOSUS: Primary | ICD-10-CM

## 2020-11-30 ENCOUNTER — TELEPHONE (OUTPATIENT)
Dept: NEUROLOGY | Facility: CLINIC | Age: 55
End: 2020-11-30

## 2020-12-03 NOTE — TELEPHONE ENCOUNTER
PT CALLING STATING THAT [facility]OLD HER THAT THEY DID NOT RECEIVE THE [facility] PAPER WORK. PLEASE FAX THEM [phone] AND TO THIS FAX#  [phone].    IF YOU HAVE ANY QUESTIONS YOU CAN CALL HER BACK [phone]

## 2020-12-08 ENCOUNTER — TELEPHONE (OUTPATIENT)
Dept: NEUROLOGY | Facility: CLINIC | Age: 55
End: 2020-12-08

## 2020-12-08 NOTE — TELEPHONE ENCOUNTER
PATIENT CALLED ASKING FOR A REFILL ON RX UBRELVY 100MG. I DID NOT SEE THIS ON THE MED LIST ON MY END.    PLEASE ADVISE PATIENT WITH UPDATE.    PH: [phone]

## 2020-12-08 NOTE — TELEPHONE ENCOUNTER
Ml notified [facility] said they have the Ubrelvy script on hold, they will fill it for her.

## 2021-01-14 ENCOUNTER — TELEPHONE (OUTPATIENT)
Dept: NEUROLOGY | Facility: CLINIC | Age: 56
End: 2021-01-14

## 2021-01-15 NOTE — TELEPHONE ENCOUNTER
Card mailed.   
Provider: TJ GROVE  Caller: YAKOV GIBSON  Relationship to Patient: SELF      Reason for Call: PT CALLING STATING THAT HER EMGAILTY DISCOUNT CARD HAS  AND SHE IS WANTING TO SEE IF SHE CAN GET A NEW ONE MAILED TO HER    PLEASE CALL HER BACK  [phone]    
Unable to leave a message.   
Initial (On Arrival)

## 2021-02-12 ENCOUNTER — OFFICE VISIT (OUTPATIENT)
Dept: NEUROLOGY | Facility: CLINIC | Age: 56
End: 2021-02-12

## 2021-02-12 VITALS
WEIGHT: 196 LBS | HEIGHT: 63 IN | SYSTOLIC BLOOD PRESSURE: 126 MMHG | HEART RATE: 84 BPM | DIASTOLIC BLOOD PRESSURE: 74 MMHG | BODY MASS INDEX: 34.73 KG/M2 | OXYGEN SATURATION: 99 %

## 2021-02-12 DIAGNOSIS — G43.119 INTRACTABLE MIGRAINE WITH AURA WITHOUT STATUS MIGRAINOSUS: Primary | ICD-10-CM

## 2021-02-12 DIAGNOSIS — R51.9 CHRONIC DAILY HEADACHE: ICD-10-CM

## 2021-02-12 PROCEDURE — 99214 OFFICE O/P EST MOD 30 MIN: CPT | Performed by: PHYSICIAN ASSISTANT

## 2021-02-12 RX ORDER — MEMANTINE HYDROCHLORIDE 10 MG/1
10 TABLET ORAL 2 TIMES DAILY
Qty: 180 TABLET | Refills: 6 | Status: SHIPPED | OUTPATIENT
Start: 2021-02-12 | End: 2021-12-21

## 2021-02-12 RX ORDER — INDOMETHACIN 25 MG/1
25 CAPSULE ORAL 3 TIMES DAILY PRN
Qty: 60 CAPSULE | Refills: 2 | Status: SHIPPED | OUTPATIENT
Start: 2021-02-12 | End: 2021-12-21 | Stop reason: SDUPTHER

## 2021-02-12 RX ORDER — GALCANEZUMAB 120 MG/ML
120 INJECTION, SOLUTION SUBCUTANEOUS
Qty: 1 PEN | Refills: 12 | Status: SHIPPED | OUTPATIENT
Start: 2021-02-12 | End: 2021-12-21

## 2021-02-12 RX ORDER — TOPIRAMATE 200 MG/1
200 CAPSULE, EXTENDED RELEASE ORAL DAILY
Qty: 30 CAPSULE | Refills: 6 | Status: SHIPPED | OUTPATIENT
Start: 2021-02-12 | End: 2021-10-19

## 2021-02-12 RX ORDER — TRAMADOL HYDROCHLORIDE 100 MG/1
100 TABLET, COATED ORAL EVERY 6 HOURS SCHEDULED
COMMUNITY
End: 2022-10-11

## 2021-02-12 RX ORDER — TIZANIDINE 4 MG/1
4 TABLET ORAL 3 TIMES DAILY PRN
Qty: 60 TABLET | Refills: 2 | Status: SHIPPED | OUTPATIENT
Start: 2021-02-12 | End: 2021-10-21

## 2021-02-12 RX ORDER — PROCHLORPERAZINE MALEATE 10 MG
10 TABLET ORAL EVERY 6 HOURS PRN
Qty: 30 TABLET | Refills: 6 | Status: SHIPPED | OUTPATIENT
Start: 2021-02-12 | End: 2021-12-21 | Stop reason: SDUPTHER

## 2021-02-12 NOTE — PROGRESS NOTES
Maryuri Macdonald is a 56 y.o. female is here today for follow-up.    She has improved fairly well on present regimen.    Migraine   This is a chronic problem. The current episode started more than 1 year ago. The problem occurs intermittently. The problem has been unchanged. The pain is located in the bilateral, retro-orbital, temporal, parietal, occipital and frontal region. The pain does not radiate. The pain quality is similar to prior headaches. The quality of the pain is described as aching, throbbing and pulsating. The pain is severe. Associated symptoms include dizziness, nausea, neck pain, phonophobia, photophobia and vomiting. The symptoms are aggravated by activity, bright light, fatigue, emotional stress and noise. She has tried acetaminophen, antidepressants, beta blockers, Excedrin, darkened room, NSAIDs, injectable narcotics, oral narcotics and triptans for the symptoms. The treatment provided significant relief. Her past medical history is significant for migraine headaches.        The following portions of the patient's history were reviewed and updated as appropriate: allergies, current medications, past family history, past medical history, past social history, past surgical history and problem list.    Review of Systems   Constitutional: Positive for activity change and fatigue.   HENT: Negative for trouble swallowing.    Eyes: Positive for photophobia and visual disturbance.   Respiratory: Positive for shortness of breath.    Cardiovascular: Negative.    Gastrointestinal: Positive for nausea and vomiting.   Musculoskeletal: Positive for neck pain.   Skin: Negative.    Allergic/Immunologic: Negative.    Neurological: Positive for dizziness and headache.   Psychiatric/Behavioral: Positive for dysphoric mood and sleep disturbance. The patient is nervous/anxious.          Current Outpatient Medications:   •  citalopram (CeleXA) 20 MG tablet, Take 40 mg by mouth Daily., Disp: , Rfl: 0  •   Emgality 120 MG/ML prefilled syringe, Inject 1 mL under the skin into the appropriate area as directed Every 30 (Thirty) Days., Disp: 1 pen, Rfl: 12  •  furosemide (LASIX) 20 MG tablet, Take 20 mg by mouth Daily., Disp: , Rfl:   •  indomethacin (INDOCIN) 25 MG capsule, Take 1 capsule by mouth 3 (Three) Times a Day As Needed for Mild Pain ., Disp: 60 capsule, Rfl: 2  •  magnesium oxide (MAGOX) 400 (241.3 Mg) MG tablet tablet, Take 1 tablet by mouth Daily., Disp: , Rfl: 2  •  melatonin 5 MG tablet tablet, Take 5 mg by mouth As Needed., Disp: , Rfl:   •  memantine (NAMENDA) 10 MG tablet, Take 1 tablet by mouth 2 (Two) Times a Day., Disp: 180 tablet, Rfl: 6  •  mirtazapine (REMERON) 15 MG tablet, Take 15 mg by mouth Daily As Needed., Disp: , Rfl:   •  potassium chloride (MICRO-K) 10 MEQ CR capsule, Take 1 capsule by mouth Daily., Disp: , Rfl: 2  •  prochlorperazine (COMPAZINE) 10 MG tablet, Take 1 tablet by mouth Every 6 (Six) Hours As Needed for Nausea or Vomiting (migraine)., Disp: 30 tablet, Rfl: 6  •  tiZANidine (ZANAFLEX) 4 MG tablet, Take 1 tablet by mouth 3 (Three) Times a Day As Needed for Muscle Spasms., Disp: 60 tablet, Rfl: 2  •  traMADol HCl (ULTRAM) 100 MG tablet, Take 100 mg by mouth Every 6 (Six) Hours., Disp: , Rfl:   •  Trokendi  MG capsule sustained-release 24 hr, Take 200 mg by mouth Daily., Disp: 30 capsule, Rfl: 6  •  ubrogepant 100 MG tablet, Take 100 mg by mouth 1 (One) Time As Needed for Migraine. TAKE AT ONSET OF MIGRAINE, MAY REPEAT IN 2HRS, DO NOT EXCEED 200MG IN 24HRS., Disp: , Rfl:   •  vitamin D (ERGOCALCIFEROL) 61103 units capsule capsule, 1 capsule 1 (One) Time Per Week., Disp: , Rfl:      Objective   Physical Exam  Vitals signs and nursing note reviewed.   HENT:      Head: Normocephalic.      Right Ear: Hearing and external ear normal.      Left Ear: Hearing and external ear normal.      Nose: Nose normal.      Mouth/Throat:      Pharynx: Oropharynx is clear.   Eyes:      General:  Lids are normal. Vision grossly intact. Gaze aligned appropriately. No scleral icterus.     Extraocular Movements: Extraocular movements intact.      Conjunctiva/sclera: Conjunctivae normal.      Pupils: Pupils are equal, round, and reactive to light.      Visual Fields: Right eye visual fields normal and left eye visual fields normal.   Neck:      Musculoskeletal: Normal range of motion.      Vascular: No carotid bruit or JVD.      Trachea: Trachea and phonation normal.   Cardiovascular:      Rate and Rhythm: Normal rate.      Heart sounds: Normal heart sounds.   Pulmonary:      Effort: Pulmonary effort is normal.      Breath sounds: Normal breath sounds.   Skin:     General: Skin is warm and dry.   Neurological:      Mental Status: She is alert and oriented to person, place, and time.      GCS: GCS eye subscore is 4. GCS verbal subscore is 5. GCS motor subscore is 6.      Cranial Nerves: Cranial nerves are intact.      Sensory: Sensation is intact.      Motor: Motor function is intact.      Coordination: Coordination is intact.      Gait: Gait is intact.      Deep Tendon Reflexes: Reflexes are normal and symmetric.   Psychiatric:         Attention and Perception: Attention and perception normal.         Mood and Affect: Mood and affect normal.         Speech: Speech normal.         Behavior: Behavior normal.         Thought Content: Thought content normal.         Cognition and Memory: Cognition and memory normal.         Judgment: Judgment normal.           Assessment/Plan   Diagnoses and all orders for this visit:    1. Intractable migraine with aura without status migrainosus (Primary)  -     Trokendi  MG capsule sustained-release 24 hr; Take 200 mg by mouth Daily.  Dispense: 30 capsule; Refill: 6  -     tiZANidine (ZANAFLEX) 4 MG tablet; Take 1 tablet by mouth 3 (Three) Times a Day As Needed for Muscle Spasms.  Dispense: 60 tablet; Refill: 2  -     prochlorperazine (COMPAZINE) 10 MG tablet; Take 1 tablet  by mouth Every 6 (Six) Hours As Needed for Nausea or Vomiting (migraine).  Dispense: 30 tablet; Refill: 6  -     memantine (NAMENDA) 10 MG tablet; Take 1 tablet by mouth 2 (Two) Times a Day.  Dispense: 180 tablet; Refill: 6  -     indomethacin (INDOCIN) 25 MG capsule; Take 1 capsule by mouth 3 (Three) Times a Day As Needed for Mild Pain .  Dispense: 60 capsule; Refill: 2  -     Emgality 120 MG/ML prefilled syringe; Inject 1 mL under the skin into the appropriate area as directed Every 30 (Thirty) Days.  Dispense: 1 pen; Refill: 12    2. Chronic daily headache  -     tiZANidine (ZANAFLEX) 4 MG tablet; Take 1 tablet by mouth 3 (Three) Times a Day As Needed for Muscle Spasms.  Dispense: 60 tablet; Refill: 2  -     indomethacin (INDOCIN) 25 MG capsule; Take 1 capsule by mouth 3 (Three) Times a Day As Needed for Mild Pain .  Dispense: 60 capsule; Refill: 2      Reviewed present recommendations in detail.  We have discussed her preventative as well as her episodic breakthrough regimen.    Questions and concerns as well as expectations regarding headache and treatment are reviewed in detail.               Dictated utilizing Dragon dictation.

## 2021-02-22 ENCOUNTER — TELEPHONE (OUTPATIENT)
Dept: NEUROLOGY | Facility: CLINIC | Age: 56
End: 2021-02-22

## 2021-02-22 NOTE — TELEPHONE ENCOUNTER
DELETE AFTER REVIEWING: Telephone encounter to be sent to the clinical pool.    Pharmacy Name:  [facility]    Pharmacy representative name: ANDREW    Pharmacy representative phone number: [phone] REF ANDERSEN- [id number]    What medication are you calling in regards to: UBRELVY 50MG    What question does the pharmacy have: ANDREW STATES THAT PA IS NEEDED TO FILL PT'S UBRELVY 50MG MEDICATION    Who is the provider that prescribed the medication: TJ GROVE    Additional notes: NONE

## 2021-02-23 ENCOUNTER — TELEPHONE (OUTPATIENT)
Dept: NEUROLOGY | Facility: CLINIC | Age: 56
End: 2021-02-23

## 2021-02-23 NOTE — TELEPHONE ENCOUNTER
Patient called and had a question about her copay, she spoke with Ellie and told her that the Billing office told her that they can't help her with her concern. I looked into her office visit and do not see what her concern is, I would recommend that she call the billing service back. I could not leave a voice message. If patient is to call back please direct her to [facility].

## 2021-02-24 NOTE — TELEPHONE ENCOUNTER
Pt has commercial insurance and has been given the direction that if PA was denied pt can still get the medication with copay card.

## 2021-03-10 ENCOUNTER — TELEPHONE (OUTPATIENT)
Dept: NEUROLOGY | Facility: CLINIC | Age: 56
End: 2021-03-10

## 2021-03-10 NOTE — TELEPHONE ENCOUNTER
Provider:  CORNELIO BRANCH  Caller:  YAKOV GIBSON  Relationship to Patient: PT    Phone Number: [phone]  Reason for Call: PT IS CALLING IN STATED SHE WENT TO THE PHARMACY TO  RX FOR Trokendi  MG capsule sustained-release 24 hr AND TRIED TO USE THE DISCOUNT CARD GIVEN TO HER BY CORNELIO BRANCH , THE PHARMACY ADVISED PT THAT THE CARD WAS    SHE WAS NOT ABLE TO GET REFILL AS THE COST WAS GOING TO BE $ 800  OUT OF POCKET .. PT STATES SHE WAS ABLE TO USE THIS CARD LAST MONTH SO UNCLEAR OF WHAT THE ISSUE IS .. IS REQUESTING A NEW CARD OR ADVICE AS TO WHAT TO DO TO GET THIS MEDICATION   When was the patient last seen: 2021

## 2021-03-10 NOTE — TELEPHONE ENCOUNTER
Ml notified she can get a co-pay card online.  I will send Ig[employer] a message requesting Bartolo SPENCER.  She will call if she needs samples.

## 2021-10-19 DIAGNOSIS — G43.119 INTRACTABLE MIGRAINE WITH AURA WITHOUT STATUS MIGRAINOSUS: ICD-10-CM

## 2021-10-19 RX ORDER — TOPIRAMATE 200 MG/1
CAPSULE, EXTENDED RELEASE ORAL
Qty: 30 CAPSULE | Refills: 0 | Status: SHIPPED | OUTPATIENT
Start: 2021-10-19 | End: 2021-11-24

## 2021-10-21 DIAGNOSIS — G43.119 INTRACTABLE MIGRAINE WITH AURA WITHOUT STATUS MIGRAINOSUS: ICD-10-CM

## 2021-10-21 DIAGNOSIS — R51.9 CHRONIC DAILY HEADACHE: ICD-10-CM

## 2021-10-21 RX ORDER — TIZANIDINE 4 MG/1
TABLET ORAL
Qty: 60 TABLET | Refills: 0 | Status: SHIPPED | OUTPATIENT
Start: 2021-10-21 | End: 2021-12-21 | Stop reason: SDUPTHER

## 2021-10-21 RX ORDER — TOPIRAMATE 200 MG/1
1 CAPSULE, EXTENDED RELEASE ORAL DAILY
Qty: 30 CAPSULE | Refills: 0 | Status: CANCELLED | OUTPATIENT
Start: 2021-10-21

## 2021-10-21 NOTE — TELEPHONE ENCOUNTER
Ml said she doesn't know if she has had the co-pay card for a year.  Explained that she can check with her pharmacy to see if the card has .  We don't have co-pay cards in the office, she should be able to get a card online if she needs it.

## 2021-10-21 NOTE — TELEPHONE ENCOUNTER
Caller: Ml Macdonald    Relationship: Self      Medication requested (name and dosage):     Requested Prescriptions:   Requested Prescriptions     Pending Prescriptions Disp Refills   • Topiramate ER (Trokendi XR) 200 MG capsule sustained-release 24 hr 30 capsule 0     Sig: Take 1 capsule by mouth Daily.     Signed Prescriptions Disp Refills   • Trokendi  MG capsule sustained-release 24 hr 30 capsule 0     Sig: Take 1 capsule by mouth once daily     Authorizing Provider: JACK BRANCH        Pharmacy where request should be sent:  [facility]     Additional details provided by patient:  PT IS OUT OF THIS MED  AND IS WONDERING IF SHE WILL NEED  A DISCOUNT RX CARD  FOR THIS  PLEASE ADVISE     Best call back number:[phone]    Does the patient have less than a 3 day supply:  [x] Yes  [] No    Sg Cheatham Rep   10/21/21 10:17 CDT

## 2021-11-24 DIAGNOSIS — G43.119 INTRACTABLE MIGRAINE WITH AURA WITHOUT STATUS MIGRAINOSUS: ICD-10-CM

## 2021-11-24 RX ORDER — TOPIRAMATE 200 MG/1
CAPSULE, EXTENDED RELEASE ORAL
Qty: 30 CAPSULE | Refills: 0 | Status: SHIPPED | OUTPATIENT
Start: 2021-11-24 | End: 2021-12-21 | Stop reason: SDUPTHER

## 2021-12-21 ENCOUNTER — OFFICE VISIT (OUTPATIENT)
Dept: NEUROLOGY | Facility: CLINIC | Age: 56
End: 2021-12-21

## 2021-12-21 VITALS
DIASTOLIC BLOOD PRESSURE: 78 MMHG | SYSTOLIC BLOOD PRESSURE: 122 MMHG | BODY MASS INDEX: 34.02 KG/M2 | WEIGHT: 192 LBS | OXYGEN SATURATION: 99 % | HEART RATE: 82 BPM | HEIGHT: 63 IN

## 2021-12-21 DIAGNOSIS — R51.9 CHRONIC DAILY HEADACHE: ICD-10-CM

## 2021-12-21 DIAGNOSIS — G43.119 INTRACTABLE MIGRAINE WITH AURA WITHOUT STATUS MIGRAINOSUS: Primary | ICD-10-CM

## 2021-12-21 PROCEDURE — 99214 OFFICE O/P EST MOD 30 MIN: CPT | Performed by: PHYSICIAN ASSISTANT

## 2021-12-21 RX ORDER — UBROGEPANT 100 MG/1
100 TABLET ORAL ONCE
Qty: 48 TABLET | Refills: 3 | Status: SHIPPED | OUTPATIENT
Start: 2021-12-21 | End: 2021-12-21

## 2021-12-21 RX ORDER — TOPIRAMATE 200 MG/1
1 CAPSULE, EXTENDED RELEASE ORAL DAILY
Qty: 30 CAPSULE | Refills: 5 | Status: SHIPPED | OUTPATIENT
Start: 2021-12-21 | End: 2022-01-27

## 2021-12-21 RX ORDER — DICYCLOMINE HYDROCHLORIDE 10 MG/1
10 CAPSULE ORAL AS NEEDED
COMMUNITY
Start: 2021-10-19

## 2021-12-21 RX ORDER — MEMANTINE HYDROCHLORIDE 10 MG/1
10 TABLET ORAL 2 TIMES DAILY
Qty: 180 TABLET | Refills: 2 | Status: CANCELLED | OUTPATIENT
Start: 2021-12-21

## 2021-12-21 RX ORDER — TIZANIDINE 4 MG/1
4 TABLET ORAL 3 TIMES DAILY PRN
Qty: 90 TABLET | Refills: 1 | Status: SHIPPED | OUTPATIENT
Start: 2021-12-21 | End: 2022-05-02

## 2021-12-21 RX ORDER — ASPIRIN 325 MG/1
325 TABLET, COATED ORAL DAILY
COMMUNITY
Start: 2021-12-09

## 2021-12-21 RX ORDER — CITALOPRAM 40 MG/1
40 TABLET ORAL DAILY
COMMUNITY
Start: 2021-10-22

## 2021-12-21 RX ORDER — INDOMETHACIN 25 MG/1
25 CAPSULE ORAL 3 TIMES DAILY PRN
Qty: 90 CAPSULE | Refills: 1 | Status: SHIPPED | OUTPATIENT
Start: 2021-12-21 | End: 2022-06-28

## 2021-12-21 RX ORDER — PROCHLORPERAZINE MALEATE 10 MG
10 TABLET ORAL EVERY 6 HOURS PRN
Qty: 30 TABLET | Refills: 1 | Status: SHIPPED | OUTPATIENT
Start: 2021-12-21 | End: 2022-10-11 | Stop reason: SDUPTHER

## 2021-12-21 RX ORDER — ATOGEPANT 60 MG/1
60 TABLET ORAL DAILY
Qty: 90 TABLET | Refills: 3 | Status: SHIPPED | OUTPATIENT
Start: 2021-12-21 | End: 2022-10-11 | Stop reason: SDUPTHER

## 2021-12-21 NOTE — PROGRESS NOTES
Subjective   Ml Macdonald is a 56 y.o. female who presents for follow-up migraine headache.     History of Present Illness     Migraine headaches  Unfortunately, she lost access to [employer] due to insurance issues. She continues taking Ubrelvy with improvement, however, she continues to have frontal headaches. She describes pressure like headaches. The patient reports having associated nosebleeds.     Epistaxis  The Epistaxis is a new symptoms. She denies having Epistaxis in the past. Her nose bleeds can last up to 15 minutes before she can stop the bleeding. She states on a scale of 1-10, her nose bleeds are a 6 out of 10.       The following portions of the patient's history were reviewed and updated as appropriate: allergies, current medications, past family history, past medical history, past social history, past surgical history and problem list.    Review of Systems:  A review of systems was performed, and positive findings are noted in the HPI.      Current Outpatient Medications:   •  citalopram (CeleXA) 40 MG tablet, Take 40 mg by mouth Daily., Disp: , Rfl:   •  dicyclomine (BENTYL) 10 MG capsule, Take 10 mg by mouth As Needed., Disp: , Rfl:   •  EQ Aspirin 325 MG tablet, , Disp: , Rfl:   •  furosemide (LASIX) 20 MG tablet, Take 20 mg by mouth Daily., Disp: , Rfl:   •  magnesium oxide (MAGOX) 400 (241.3 Mg) MG tablet tablet, Take 1 tablet by mouth Daily., Disp: , Rfl: 2  •  melatonin 5 MG tablet tablet, Take 5 mg by mouth As Needed., Disp: , Rfl:   •  mirtazapine (REMERON) 15 MG tablet, Take 15 mg by mouth Daily As Needed., Disp: , Rfl:   •  potassium chloride (MICRO-K) 10 MEQ CR capsule, Take 1 capsule by mouth Daily., Disp: , Rfl: 2  •  traMADol HCl (ULTRAM) 100 MG tablet, Take 100 mg by mouth Every 6 (Six) Hours., Disp: , Rfl:   •  vitamin D (ERGOCALCIFEROL) 73682 units capsule capsule, 1 capsule 1 (One) Time Per Week., Disp: , Rfl:   •  Atogepant (Qulipta) 60 MG tablet, Take 1 tablet by mouth Daily.,  Disp: 90 tablet, Rfl: 3  •  indomethacin (INDOCIN) 25 MG capsule, Take 1 capsule by mouth 3 (Three) Times a Day As Needed for Mild Pain ., Disp: 90 capsule, Rfl: 1  •  prochlorperazine (COMPAZINE) 10 MG tablet, Take 1 tablet by mouth Every 6 (Six) Hours As Needed for Nausea or Vomiting (migraine)., Disp: 30 tablet, Rfl: 1  •  tiZANidine (ZANAFLEX) 4 MG tablet, Take 1 tablet by mouth 3 (Three) Times a Day As Needed for Muscle Spasms. for muscle spams, Disp: 90 tablet, Rfl: 1  •  Topiramate ER (Trokendi XR) 200 MG capsule sustained-release 24 hr, Take 1 capsule by mouth Daily., Disp: 30 capsule, Rfl: 5  •  ubrogepant 100 MG tablet, Take 1 tablet by mouth 1 (One) Time for 1 dose. TAKE AT ONSET OF MIGRAINE, MAY REPEAT IN 2HRS, DO NOT EXCEED 200MG IN 24HRS., Disp: 48 tablet, Rfl: 3     Objective   Physical Exam  Vitals and nursing note reviewed.   HENT:      Head: Normocephalic.      Right Ear: Hearing and external ear normal.      Left Ear: Hearing and external ear normal.      Nose: Nose normal.      Mouth/Throat:      Pharynx: Oropharynx is clear.   Eyes:      General: Lids are normal. Vision grossly intact. Gaze aligned appropriately. No scleral icterus.     Extraocular Movements: Extraocular movements intact.      Conjunctiva/sclera: Conjunctivae normal.      Pupils: Pupils are equal, round, and reactive to light.      Visual Fields: Right eye visual fields normal and left eye visual fields normal.   Neck:      Vascular: No carotid bruit or JVD.      Trachea: Trachea and phonation normal.   Cardiovascular:      Rate and Rhythm: Normal rate.      Heart sounds: Normal heart sounds.   Pulmonary:      Effort: Pulmonary effort is normal.      Breath sounds: Normal breath sounds.   Musculoskeletal:      Cervical back: Normal range of motion.   Skin:     General: Skin is warm and dry.   Neurological:      Mental Status: She is alert and oriented to person, place, and time.      GCS: GCS eye subscore is 4. GCS verbal  subscore is 5. GCS motor subscore is 6.      Cranial Nerves: Cranial nerves are intact.      Sensory: Sensation is intact.      Motor: Motor function is intact.      Coordination: Coordination is intact.      Gait: Gait is intact.      Deep Tendon Reflexes: Reflexes are normal and symmetric.   Psychiatric:         Attention and Perception: Attention and perception normal.         Mood and Affect: Mood and affect normal.         Speech: Speech normal.         Behavior: Behavior normal.         Thought Content: Thought content normal.         Cognition and Memory: Cognition and memory normal.         Judgment: Judgment normal.           Assessment/Plan   Diagnoses and all orders for this visit:    1. Intractable migraine with aura without status migrainosus (Primary)  - She continues to have breakthrough headaches despite her medication regimen. Recommended discontinuing the Namenda. Continue on the Ubrelvy. Will prescribe her Atogepant 60 mg daily. She can continue on the Trokendi for now but we will work on weaning her off.   -     indomethacin (INDOCIN) 25 MG capsule; Take 1 capsule by mouth 3 (Three) Times a Day As Needed for Mild Pain .  Dispense: 90 capsule; Refill: 1  -     prochlorperazine (COMPAZINE) 10 MG tablet; Take 1 tablet by mouth Every 6 (Six) Hours As Needed for Nausea or Vomiting (migraine).  Dispense: 30 tablet; Refill: 1  -     Topiramate ER (Trokendi XR) 200 MG capsule sustained-release 24 hr; Take 1 capsule by mouth Daily.  Dispense: 30 capsule; Refill: 5  -     tiZANidine (ZANAFLEX) 4 MG tablet; Take 1 tablet by mouth 3 (Three) Times a Day As Needed for Muscle Spasms. for muscle spams  Dispense: 90 tablet; Refill: 1  -     ubrogepant 100 MG tablet; Take 1 tablet by mouth 1 (One) Time for 1 dose. TAKE AT ONSET OF MIGRAINE, MAY REPEAT IN 2HRS, DO NOT EXCEED 200MG IN 24HRS.  Dispense: 48 tablet; Refill: 3  -     Atogepant (Qulipta) 60 MG tablet; Take 1 tablet by mouth Daily.  Dispense: 90 tablet;  Refill: 3    2. Chronic daily headache  -     indomethacin (INDOCIN) 25 MG capsule; Take 1 capsule by mouth 3 (Three) Times a Day As Needed for Mild Pain .  Dispense: 90 capsule; Refill: 1  -     prochlorperazine (COMPAZINE) 10 MG tablet; Take 1 tablet by mouth Every 6 (Six) Hours As Needed for Nausea or Vomiting (migraine).  Dispense: 30 tablet; Refill: 1  -     Topiramate ER (Trokendi XR) 200 MG capsule sustained-release 24 hr; Take 1 capsule by mouth Daily.  Dispense: 30 capsule; Refill: 5  -     tiZANidine (ZANAFLEX) 4 MG tablet; Take 1 tablet by mouth 3 (Three) Times a Day As Needed for Muscle Spasms. for muscle spams  Dispense: 90 tablet; Refill: 1  -     ubrogepant 100 MG tablet; Take 1 tablet by mouth 1 (One) Time for 1 dose. TAKE AT ONSET OF MIGRAINE, MAY REPEAT IN 2HRS, DO NOT EXCEED 200MG IN 24HRS.  Dispense: 48 tablet; Refill: 3             Transcribed from ambient dictation for TJ Seo by Sg SALOMON Rep.  12/21/21   13:45 CST    Patient verbalized consent to the visit recording.  I have personally performed the services described in this document as transcribed by the above individual, and it is both accurate and complete.  TJ Seo  12/21/2021  14:59 CST

## 2022-01-24 ENCOUNTER — TELEPHONE (OUTPATIENT)
Dept: NEUROLOGY | Facility: CLINIC | Age: 57
End: 2022-01-24

## 2022-01-24 NOTE — TELEPHONE ENCOUNTER
SHOLA TAVARES WITH [employer] CALLED    [phone]    CALLED TO INFORM PATIENT HAS BEEN APPROVED FOR [employer] - THEY WILL SUPPLY AND COVER COST OF QULIPTA UNTIL HER INSURANCE - UP UNTIL 2 YEARS

## 2022-01-26 DIAGNOSIS — R51.9 CHRONIC DAILY HEADACHE: ICD-10-CM

## 2022-01-26 DIAGNOSIS — G43.119 INTRACTABLE MIGRAINE WITH AURA WITHOUT STATUS MIGRAINOSUS: ICD-10-CM

## 2022-01-27 RX ORDER — TOPIRAMATE 200 MG/1
CAPSULE, EXTENDED RELEASE ORAL
Qty: 30 CAPSULE | Refills: 5 | Status: SHIPPED | OUTPATIENT
Start: 2022-01-27 | End: 2022-09-02

## 2022-01-27 NOTE — TELEPHONE ENCOUNTER
Ml said [employer] told her Trokendi would cost $700 or $800.  She would like a script sent to [facility].

## 2022-03-04 ENCOUNTER — TELEPHONE (OUTPATIENT)
Dept: NEUROLOGY | Facility: CLINIC | Age: 57
End: 2022-03-04

## 2022-03-04 NOTE — TELEPHONE ENCOUNTER
Provider: JACK BRANCH  Caller: PATIENT  Relationship to Patient: SELF  Pharmacy: NA  Phone Number: [phone]  Reason for Call: PATIENT ASKING IF OFFICE HAS RECEIVED [employer] PAPERWORK FOR HER MIGRAINES. PLEASE CALL PATIENT BACK AND ADVISE AND ITS OKAY TO LEAVE VOICEMAIL.   When was the patient last seen: 12-21-21

## 2022-03-07 NOTE — TELEPHONE ENCOUNTER
I called and spoke with Mrs. Macdonald about the forms. I told her I will fax them today and I will mail her the originals for her records. She did voice understanding.

## 2022-05-02 DIAGNOSIS — R51.9 CHRONIC DAILY HEADACHE: ICD-10-CM

## 2022-05-02 DIAGNOSIS — G43.119 INTRACTABLE MIGRAINE WITH AURA WITHOUT STATUS MIGRAINOSUS: ICD-10-CM

## 2022-05-02 RX ORDER — TIZANIDINE 4 MG/1
TABLET ORAL
Qty: 90 TABLET | Refills: 1 | Status: SHIPPED | OUTPATIENT
Start: 2022-05-02 | End: 2022-09-07

## 2022-06-26 DIAGNOSIS — R51.9 CHRONIC DAILY HEADACHE: ICD-10-CM

## 2022-06-26 DIAGNOSIS — G43.119 INTRACTABLE MIGRAINE WITH AURA WITHOUT STATUS MIGRAINOSUS: ICD-10-CM

## 2022-06-28 RX ORDER — INDOMETHACIN 25 MG/1
CAPSULE ORAL
Qty: 90 CAPSULE | Refills: 0 | Status: SHIPPED | OUTPATIENT
Start: 2022-06-28 | End: 2022-09-07

## 2022-08-25 ENCOUNTER — TELEPHONE (OUTPATIENT)
Dept: NEUROLOGY | Facility: CLINIC | Age: 57
End: 2022-08-25

## 2022-08-25 NOTE — TELEPHONE ENCOUNTER
Caller: YAKOV  Relationship to Patient:SELF  Phone Number: [phone]    Reason for Call: PT DROPPED OFF PAPERWORK ([employer]ML[employer] PPW) ON 8-23-22 AND SHE WANTS TO  ON 8-26-22, SHE WANTED TO CHECK ON THE STATUS PLEASE REVIEW AND ADVISE.

## 2022-08-26 NOTE — TELEPHONE ENCOUNTER
Caller: YAKOV  Relationship to Patient: SELF  Phone Number: [phone]    Reason for Call: PT CALLED TO CHECK ON STATUS AND SEE IF THE PPW IS READY TO  OR NOT - I ADVISED I DO NOT SEE ANYTHING IN CHART BUT I WILL CALL CLINIC AND CHECK ON STATUS - I CALLED CLINIC BUT NO ANSWER. PLEASE ADVISE AS PT STATES THIS PPW IS DUE BY 8-31-22

## 2022-08-29 NOTE — TELEPHONE ENCOUNTER
Caller: Ml Macdonald    Relationship: Self    Best call back number: [phone]    What is the best time to reach you: ANY    Who are you requesting to speak with (clinical staff, provider,  specific staff member): RITA      What was the call regarding: PATIENT IS CALLING RE:ACCOMODATION FORMS ([place] PPW). SHE IS CALLING TO PICK COMPLETED FORMS UP.  STATES THEY WILL BE READY FOR  IN THE AM.    PLEASE CALL& ADVISE WHEN THEY ARE READY FOR     THANK YOU

## 2022-08-29 NOTE — TELEPHONE ENCOUNTER
Caller: YAKOV  Relationship to Patient: SELF  Phone Number: [phone] PT IS AT WORK, PLEASE LEAVE VOICE MSG TO LET HER KNOW IF SHE CAN COME BY THE OFFICE TO GET THE COMPLETED PAPERWORK.     PT CALLED BACK TO SEE IF THE [facility] PAPERWORK FOR [employer] HAD BEEN COMPLETED.    TOLD PT IT WAS COMPLETED ON 8-29-22 AND THAT IT WAS FAXED.    PT IS ASKING WHERE IT WAS FAXED? PT NEEDS TO GET THE PAPERWORK TODAY. PLEASE CALL THE PT TO LET HER KNOW IF SHE CAN COME TO THE OFFICE TO GET THE COMPLETED PAPERWORK.    IT MUST BE TURNED IN ON 8-31-22. THE PAPERWORK HAD A STICKY NOTE ON IT WITH THE PT'S NAME.

## 2022-08-30 NOTE — TELEPHONE ENCOUNTER
PATIENT CALLED TO CHECK ON PPW.  I ADVISED THE PATIENT IT IS READY FOR HER TO .  PATIENT STATED SHE WILL  AT OFFICE

## 2022-09-02 DIAGNOSIS — G43.119 INTRACTABLE MIGRAINE WITH AURA WITHOUT STATUS MIGRAINOSUS: ICD-10-CM

## 2022-09-02 DIAGNOSIS — R51.9 CHRONIC DAILY HEADACHE: ICD-10-CM

## 2022-09-02 RX ORDER — TOPIRAMATE 200 MG/1
CAPSULE, EXTENDED RELEASE ORAL
Qty: 30 CAPSULE | Refills: 0 | Status: SHIPPED | OUTPATIENT
Start: 2022-09-02 | End: 2022-09-22

## 2022-09-07 DIAGNOSIS — G43.119 INTRACTABLE MIGRAINE WITH AURA WITHOUT STATUS MIGRAINOSUS: ICD-10-CM

## 2022-09-07 DIAGNOSIS — R51.9 CHRONIC DAILY HEADACHE: ICD-10-CM

## 2022-09-07 RX ORDER — INDOMETHACIN 25 MG/1
CAPSULE ORAL
Qty: 90 CAPSULE | Refills: 0 | Status: SHIPPED | OUTPATIENT
Start: 2022-09-07 | End: 2022-12-05

## 2022-09-07 RX ORDER — TIZANIDINE 4 MG/1
TABLET ORAL
Qty: 90 TABLET | Refills: 0 | Status: SHIPPED | OUTPATIENT
Start: 2022-09-07 | End: 2022-10-11 | Stop reason: SDUPTHER

## 2022-09-21 DIAGNOSIS — R51.9 CHRONIC DAILY HEADACHE: ICD-10-CM

## 2022-09-21 DIAGNOSIS — G43.119 INTRACTABLE MIGRAINE WITH AURA WITHOUT STATUS MIGRAINOSUS: ICD-10-CM

## 2022-09-22 RX ORDER — TOPIRAMATE 200 MG/1
CAPSULE, EXTENDED RELEASE ORAL
Qty: 30 CAPSULE | Refills: 0 | Status: SHIPPED | OUTPATIENT
Start: 2022-09-22 | End: 2022-10-11 | Stop reason: SDUPTHER

## 2022-10-11 ENCOUNTER — OFFICE VISIT (OUTPATIENT)
Dept: NEUROLOGY | Facility: CLINIC | Age: 57
End: 2022-10-11

## 2022-10-11 VITALS
DIASTOLIC BLOOD PRESSURE: 70 MMHG | WEIGHT: 179 LBS | OXYGEN SATURATION: 99 % | HEIGHT: 63 IN | SYSTOLIC BLOOD PRESSURE: 130 MMHG | BODY MASS INDEX: 31.71 KG/M2 | HEART RATE: 71 BPM

## 2022-10-11 DIAGNOSIS — G43.119 INTRACTABLE MIGRAINE WITH AURA WITHOUT STATUS MIGRAINOSUS: Primary | ICD-10-CM

## 2022-10-11 DIAGNOSIS — R51.9 CHRONIC DAILY HEADACHE: ICD-10-CM

## 2022-10-11 PROCEDURE — 99214 OFFICE O/P EST MOD 30 MIN: CPT | Performed by: PHYSICIAN ASSISTANT

## 2022-10-11 RX ORDER — TOPIRAMATE 200 MG/1
1 CAPSULE, EXTENDED RELEASE ORAL DAILY
Qty: 30 CAPSULE | Refills: 5 | Status: SHIPPED | OUTPATIENT
Start: 2022-10-11

## 2022-10-11 RX ORDER — UBROGEPANT 100 MG/1
100 TABLET ORAL AS NEEDED
COMMUNITY
Start: 2022-10-10 | End: 2022-10-11 | Stop reason: SDUPTHER

## 2022-10-11 RX ORDER — UBROGEPANT 100 MG/1
100 TABLET ORAL AS NEEDED
Qty: 16 TABLET | Refills: 11 | Status: SHIPPED | OUTPATIENT
Start: 2022-10-11

## 2022-10-11 RX ORDER — PROCHLORPERAZINE MALEATE 10 MG
10 TABLET ORAL EVERY 6 HOURS PRN
Qty: 30 TABLET | Refills: 1 | Status: SHIPPED | OUTPATIENT
Start: 2022-10-11

## 2022-10-11 RX ORDER — TIZANIDINE 4 MG/1
4 TABLET ORAL EVERY 6 HOURS PRN
Qty: 90 TABLET | Refills: 0 | Status: SHIPPED | OUTPATIENT
Start: 2022-10-11 | End: 2022-12-12

## 2022-10-11 RX ORDER — ATOGEPANT 60 MG/1
60 TABLET ORAL DAILY
Qty: 90 TABLET | Refills: 3 | Status: SHIPPED | OUTPATIENT
Start: 2022-10-11

## 2022-10-11 NOTE — PROGRESS NOTES
Maryuri Macdonald is a 57 y.o. female is here today for follow-up.    History of Present Illness       Migraine and chronic daily headache  The patient states she is feeling pretty good and making some progress. The frequency and intensity of her migraines has improved. She is tolerating her medications well. She denies any new medical issues. She has changed jobs recently.    The following portions of the patient's history were reviewed and updated as appropriate: allergies, current medications, past family history, past medical history, past social history, past surgical history and problem list.    Review of Systems:  A review of systems was performed, and positive findings are noted in the HPI.      Current Outpatient Medications:   •  Atogepant (Qulipta) 60 MG tablet, Take 1 tablet by mouth Daily., Disp: 90 tablet, Rfl: 3  •  citalopram (CeleXA) 40 MG tablet, Take 40 mg by mouth Daily., Disp: , Rfl:   •  dicyclomine (BENTYL) 10 MG capsule, Take 10 mg by mouth As Needed., Disp: , Rfl:   •  EQ Aspirin 325 MG tablet, Take 1 tablet by mouth Daily., Disp: , Rfl:   •  indomethacin (INDOCIN) 25 MG capsule, TAKE 1 CAPSULE BY MOUTH THREE TIMES DAILY AS NEEDED FOR MILD PAIN (Patient taking differently: Take 1 capsule by mouth 2 (Two) Times a Day As Needed for Mild Pain.), Disp: 90 capsule, Rfl: 0  •  magnesium oxide (MAGOX) 400 (241.3 Mg) MG tablet tablet, Take 1 tablet by mouth Daily., Disp: , Rfl: 2  •  melatonin 5 MG tablet tablet, Take 5 mg by mouth As Needed., Disp: , Rfl:   •  mirtazapine (REMERON) 15 MG tablet, Take 15 mg by mouth Daily As Needed., Disp: , Rfl:   •  potassium chloride (MICRO-K) 10 MEQ CR capsule, Take 1 capsule by mouth As Needed., Disp: , Rfl: 2  •  prochlorperazine (COMPAZINE) 10 MG tablet, Take 1 tablet by mouth Every 6 (Six) Hours As Needed for Nausea or Vomiting (migraine)., Disp: 30 tablet, Rfl: 1  •  tiZANidine (ZANAFLEX) 4 MG tablet, Take 1 tablet by mouth Every 6 (Six) Hours As  Needed for Muscle Spasms. for muscle spams, Disp: 90 tablet, Rfl: 0  •  Topiramate ER (Trokendi XR) 200 MG capsule sustained-release 24 hr, Take 1 capsule by mouth Daily., Disp: 30 capsule, Rfl: 5  •  ubrogepant 100 MG tablet, Take 1 tablet by mouth As Needed (TAKE AT ONSET OF MIGRAINE, MAY REPEAT IN 2HRS IF NEEDED. DO NOT EXCEED 200MG IN 24HRS.)., Disp: 16 tablet, Rfl: 11  •  vitamin D (ERGOCALCIFEROL) 04960 units capsule capsule, 1 capsule 1 (One) Time Per Week., Disp: , Rfl:      Objective   Physical Exam  Vitals and nursing note reviewed.   HENT:      Head: Normocephalic.      Right Ear: Hearing and external ear normal.      Left Ear: Hearing and external ear normal.      Nose: Nose normal.      Mouth/Throat:      Pharynx: Oropharynx is clear.   Eyes:      General: Lids are normal. Vision grossly intact. Gaze aligned appropriately. No scleral icterus.     Extraocular Movements: Extraocular movements intact.      Conjunctiva/sclera: Conjunctivae normal.      Pupils: Pupils are equal, round, and reactive to light.      Visual Fields: Right eye visual fields normal and left eye visual fields normal.   Neck:      Vascular: No carotid bruit or JVD.      Trachea: Trachea and phonation normal.   Cardiovascular:      Rate and Rhythm: Normal rate.      Heart sounds: Normal heart sounds.   Pulmonary:      Effort: Pulmonary effort is normal.      Breath sounds: Normal breath sounds.   Musculoskeletal:      Cervical back: Normal range of motion.   Skin:     General: Skin is warm and dry.   Neurological:      Mental Status: She is alert and oriented to person, place, and time.      GCS: GCS eye subscore is 4. GCS verbal subscore is 5. GCS motor subscore is 6.      Cranial Nerves: Cranial nerves are intact.      Sensory: Sensation is intact.      Motor: Motor function is intact.      Coordination: Coordination is intact.      Gait: Gait is intact.      Deep Tendon Reflexes: Reflexes are normal and symmetric.   Psychiatric:          Attention and Perception: Attention and perception normal.         Mood and Affect: Mood and affect normal.         Speech: Speech normal.         Behavior: Behavior normal.         Thought Content: Thought content normal.         Cognition and Memory: Cognition and memory normal.         Judgment: Judgment normal.           Assessment & Plan   Diagnoses and all orders for this visit:    1. Intractable migraine with aura without status migrainosus (Primary)  -     Atogepant (Qulipta) 60 MG tablet; Take 1 tablet by mouth Daily.  Dispense: 90 tablet; Refill: 3  -     tiZANidine (ZANAFLEX) 4 MG tablet; Take 1 tablet by mouth Every 6 (Six) Hours As Needed for Muscle Spasms. for muscle spams  Dispense: 90 tablet; Refill: 0  -     prochlorperazine (COMPAZINE) 10 MG tablet; Take 1 tablet by mouth Every 6 (Six) Hours As Needed for Nausea or Vomiting (migraine).  Dispense: 30 tablet; Refill: 1  -     ubrogepant 100 MG tablet; Take 1 tablet by mouth As Needed (TAKE AT ONSET OF MIGRAINE, MAY REPEAT IN 2HRS IF NEEDED. DO NOT EXCEED 200MG IN 24HRS.).  Dispense: 16 tablet; Refill: 11  -     Topiramate ER (Trokendi XR) 200 MG capsule sustained-release 24 hr; Take 1 capsule by mouth Daily.  Dispense: 30 capsule; Refill: 5    2. Chronic daily headache  -     tiZANidine (ZANAFLEX) 4 MG tablet; Take 1 tablet by mouth Every 6 (Six) Hours As Needed for Muscle Spasms. for muscle spams  Dispense: 90 tablet; Refill: 0  -     prochlorperazine (COMPAZINE) 10 MG tablet; Take 1 tablet by mouth Every 6 (Six) Hours As Needed for Nausea or Vomiting (migraine).  Dispense: 30 tablet; Refill: 1  -     Topiramate ER (Trokendi XR) 200 MG capsule sustained-release 24 hr; Take 1 capsule by mouth Daily.  Dispense: 30 capsule; Refill: 5        1. Chronic daily headache  - Presently, we will continue her on the same medication regimen and have her return in follow-up in about 6 or 7 months. She will contact us if she has any issues with access to  medication or change in symptoms in the interim.             Transcribed from ambient dictation for TJ Seo by Jessica Hall.  10/11/22   15:23 CDT    Patient or patient representative verbalized consent to the visit recording.  I have personally performed the services described in this document as transcribed by the above individual, and it is both accurate and complete.

## 2022-12-04 DIAGNOSIS — R51.9 CHRONIC DAILY HEADACHE: ICD-10-CM

## 2022-12-04 DIAGNOSIS — G43.119 INTRACTABLE MIGRAINE WITH AURA WITHOUT STATUS MIGRAINOSUS: ICD-10-CM

## 2022-12-05 RX ORDER — INDOMETHACIN 25 MG/1
CAPSULE ORAL
Qty: 90 CAPSULE | Refills: 0 | Status: SHIPPED | OUTPATIENT
Start: 2022-12-05 | End: 2023-03-08

## 2022-12-11 DIAGNOSIS — G43.119 INTRACTABLE MIGRAINE WITH AURA WITHOUT STATUS MIGRAINOSUS: ICD-10-CM

## 2022-12-11 DIAGNOSIS — R51.9 CHRONIC DAILY HEADACHE: ICD-10-CM

## 2022-12-12 RX ORDER — TIZANIDINE 4 MG/1
TABLET ORAL
Qty: 90 TABLET | Refills: 1 | Status: SHIPPED | OUTPATIENT
Start: 2022-12-12

## 2023-03-06 DIAGNOSIS — R51.9 CHRONIC DAILY HEADACHE: ICD-10-CM

## 2023-03-06 DIAGNOSIS — G43.119 INTRACTABLE MIGRAINE WITH AURA WITHOUT STATUS MIGRAINOSUS: ICD-10-CM

## 2023-03-08 RX ORDER — INDOMETHACIN 25 MG/1
CAPSULE ORAL
Qty: 90 CAPSULE | Refills: 0 | Status: SHIPPED | OUTPATIENT
Start: 2023-03-08